# Patient Record
Sex: FEMALE | Race: WHITE | Employment: FULL TIME | URBAN - METROPOLITAN AREA
[De-identification: names, ages, dates, MRNs, and addresses within clinical notes are randomized per-mention and may not be internally consistent; named-entity substitution may affect disease eponyms.]

---

## 2017-05-05 LAB
HEPATITIS B SURFACE ANTIGEN OB: NEGATIVE
HIV RESULT OB: NEGATIVE
RAPID PLASMA REAGIN OB: NONREACTIVE

## 2017-09-07 ENCOUNTER — HOSPITAL ENCOUNTER (OUTPATIENT)
Facility: HOSPITAL | Age: 22
Setting detail: OBSERVATION
Discharge: HOME | End: 2017-09-07
Attending: OBSTETRICS & GYNECOLOGY | Admitting: OBSTETRICS & GYNECOLOGY
Payer: COMMERCIAL

## 2017-09-07 VITALS — RESPIRATION RATE: 16 BRPM | BODY MASS INDEX: 23 KG/M2 | HEIGHT: 62 IN | HEART RATE: 90 BPM | WEIGHT: 125 LBS

## 2017-09-07 PROBLEM — Z34.90 PREGNANCY: Status: ACTIVE | Noted: 2017-09-07

## 2017-09-07 PROBLEM — Z34.90 PREGNANCY (HCC): Status: ACTIVE | Noted: 2017-09-07

## 2017-09-07 LAB
BILIRUBIN URINE: NEGATIVE
CONTROL RUN WITHIN 24 HOURS?: YES
GLUCOSE URINE: NEGATIVE
KETONE URINE: NEGATIVE
NITRITE URINE: NEGATIVE
PH URINE: 5.5 (ref 5–8)
PROTEIN URINE: NEGATIVE
SPEC GRAVITY: 1.02 (ref 1–1.03)
UROBILINOGEN URINE: 0.2

## 2017-09-07 PROCEDURE — 96372 THER/PROPH/DIAG INJ SC/IM: CPT

## 2017-09-07 PROCEDURE — 81002 URINALYSIS NONAUTO W/O SCOPE: CPT

## 2017-09-07 RX ORDER — TERBUTALINE SULFATE 1 MG/ML
0.25 INJECTION, SOLUTION SUBCUTANEOUS
Status: DISPENSED | OUTPATIENT
Start: 2017-09-07 | End: 2017-09-07

## 2017-09-07 RX ORDER — CHOLECALCIFEROL (VITAMIN D3) 25 MCG
1 TABLET,CHEWABLE ORAL DAILY
COMMUNITY
End: 2018-05-17 | Stop reason: ALTCHOICE

## 2017-09-07 NOTE — PROGRESS NOTES
Received thept to the unit via w/c with c/o bleeding. Pt to triage room to chamge and to void. Pt then into bed that is in the low locked position. efm explained and then applied. No active bleeding noted at this time.   Hx obtained- pt is a 22 yo

## 2017-09-07 NOTE — PROGRESS NOTES
efm removed. D/c explained to the pt. Pt voices her understanding and agreement.   Pt changed and to home in stable cond

## 2017-09-14 ENCOUNTER — HOSPITAL ENCOUNTER (OUTPATIENT)
Facility: HOSPITAL | Age: 22
Setting detail: OBSERVATION
Discharge: HOME | End: 2017-09-14
Attending: OBSTETRICS & GYNECOLOGY | Admitting: OBSTETRICS & GYNECOLOGY
Payer: COMMERCIAL

## 2017-09-14 VITALS
HEART RATE: 83 BPM | SYSTOLIC BLOOD PRESSURE: 118 MMHG | BODY MASS INDEX: 22.79 KG/M2 | HEIGHT: 62.5 IN | RESPIRATION RATE: 16 BRPM | DIASTOLIC BLOOD PRESSURE: 63 MMHG | WEIGHT: 127 LBS | TEMPERATURE: 99 F

## 2017-09-14 LAB
BILIRUBIN URINE: NEGATIVE
CONTROL RUN WITHIN 24 HOURS?: YES
GLUCOSE URINE: NEGATIVE
KETONE URINE: NEGATIVE
NITRITE URINE: NEGATIVE
PH URINE: 7 (ref 5–8)
PROTEIN URINE: NEGATIVE
SPEC GRAVITY: 1.02 (ref 1–1.03)
URINE CLARITY: CLEAR
URINE COLOR: YELLOW
UROBILINOGEN URINE: 0.2

## 2017-09-14 PROCEDURE — 81002 URINALYSIS NONAUTO W/O SCOPE: CPT

## 2017-09-14 NOTE — PROGRESS NOTES
Pt presents as  w/ edc of 17 c/o vaginal bleeding. Pt admitted to triage room 5 via wc accompanied by father. EFM tested and applied, FHTs tracing and audible in 140s.  Pt states she went to the bathroom at [address] and noticed bright red blood when s

## 2017-09-14 NOTE — PROGRESS NOTES
Discharge instructions given and explained, pt verbalizes understanding and agrees. Aware she is to call the office in the morning to schedule a follow up appointment this week. Pt ambulatory and discharged home accompanied by father.

## 2017-09-14 NOTE — NST
Nonstress Test   Patient: Kody Mccauley    Gestation: 45E4H    NST:       Variability: Moderate           Accelerations: Yes           Decelerations: Variable            Baseline: 140 BPM           Uterine Irritability: Yes           Contractions: Not pr

## 2017-10-11 ENCOUNTER — HOSPITAL ENCOUNTER (OUTPATIENT)
Age: 22
Discharge: OTHER INSTITUTION | End: 2017-10-11
Payer: COMMERCIAL

## 2017-10-11 ENCOUNTER — HOSPITAL ENCOUNTER (OUTPATIENT)
Facility: HOSPITAL | Age: 22
Setting detail: OBSERVATION
Discharge: HOME | End: 2017-10-11
Attending: OBSTETRICS & GYNECOLOGY | Admitting: OBSTETRICS & GYNECOLOGY
Payer: COMMERCIAL

## 2017-10-11 VITALS
DIASTOLIC BLOOD PRESSURE: 72 MMHG | TEMPERATURE: 98 F | BODY MASS INDEX: 24 KG/M2 | OXYGEN SATURATION: 98 % | SYSTOLIC BLOOD PRESSURE: 109 MMHG | RESPIRATION RATE: 16 BRPM | HEART RATE: 76 BPM | WEIGHT: 136 LBS

## 2017-10-11 VITALS
DIASTOLIC BLOOD PRESSURE: 72 MMHG | HEIGHT: 62 IN | RESPIRATION RATE: 16 BRPM | SYSTOLIC BLOOD PRESSURE: 119 MMHG | TEMPERATURE: 99 F | BODY MASS INDEX: 25.03 KG/M2 | WEIGHT: 136 LBS | HEART RATE: 75 BPM

## 2017-10-11 DIAGNOSIS — N94.9 GENITAL LESION, FEMALE: ICD-10-CM

## 2017-10-11 DIAGNOSIS — O46.90 VAGINAL BLEEDING DURING PREGNANCY, ANTEPARTUM: Primary | ICD-10-CM

## 2017-10-11 PROCEDURE — 81002 URINALYSIS NONAUTO W/O SCOPE: CPT

## 2017-10-11 PROCEDURE — 99205 OFFICE O/P NEW HI 60 MIN: CPT

## 2017-10-11 PROCEDURE — 81002 URINALYSIS NONAUTO W/O SCOPE: CPT | Performed by: PHYSICIAN ASSISTANT

## 2017-10-11 PROCEDURE — 99215 OFFICE O/P EST HI 40 MIN: CPT

## 2017-10-11 PROCEDURE — 87529 HSV DNA AMP PROBE: CPT | Performed by: PHYSICIAN ASSISTANT

## 2017-10-11 RX ORDER — VALACYCLOVIR HYDROCHLORIDE 1 G/1
1 TABLET, FILM COATED ORAL EVERY 12 HOURS
Qty: 20 TABLET | Refills: 0 | Status: SHIPPED | OUTPATIENT
Start: 2017-10-11 | End: 2017-10-21

## 2017-10-11 NOTE — ED PROVIDER NOTES
Patient Seen in: [facility]    History   Patient presents with:  Eval-G (gynecologic)    Stated Complaint: LG PRIEST    HPI    CHIEF COMPLAINT: Possible herpes outbreak, vaginal bleeding during pregnancy     HISTORY OF PRESENT Montse Escobar vomiting  Genitourinary: no hematuria  Musculoskeletal: no back pain  Skin: no rashes  Neurological: no headache     Otherwise a complete review of systems was obtained and other than the HPI was negative     The patient's medication list, past medical his Result Value    Urine Clarity Slightly cloudy (*)     All other components within normal limits   HSV 1/2 SUBTYPE BY PCR       ============================================================  ED Course  --------------------------------------------------------

## 2017-10-11 NOTE — PROGRESS NOTES
Patient given discharge instructions.  Instructed to call OB's office with any future outbreaks or problems first. Patient voiced understanding
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
## 2017-10-11 NOTE — NST
Nonstress Test   Patient: Janusz Salomon    Gestation: 30w3d    NST:       Variability: Moderate           Accelerations: Yes           Decelerations: None            Baseline: 140 BPM           Uterine Irritability: No           Contractions: Not present

## 2017-10-11 NOTE — PROGRESS NOTES
Patient admitted to triage 3. EFA 30+3 weeks. States she was at [facility] immediate care for HSV outbreak, they prescribed her meds and told her to come here for spotting she has had for last month which her OB's are aware of. EFM tested and applied.

## 2017-10-16 NOTE — ED NOTES
Called to patient and made aware of the positive HSV 1 PCR result. Instructed to completed treatment of Flagyl-appropriate medication.   Discussed with WILLIAM Denson re:patient questions re specific type of HSV 1-oral and HSV 2-genital.   MAde aware type

## 2017-10-29 ENCOUNTER — HOSPITAL ENCOUNTER (OUTPATIENT)
Age: 22
Discharge: HOME | End: 2017-10-29
Attending: FAMILY MEDICINE
Payer: COMMERCIAL

## 2017-10-29 VITALS
HEIGHT: 62 IN | OXYGEN SATURATION: 99 % | TEMPERATURE: 98 F | HEART RATE: 87 BPM | WEIGHT: 136 LBS | BODY MASS INDEX: 25.03 KG/M2 | RESPIRATION RATE: 18 BRPM | SYSTOLIC BLOOD PRESSURE: 114 MMHG | DIASTOLIC BLOOD PRESSURE: 67 MMHG

## 2017-10-29 DIAGNOSIS — J02.9 ACUTE VIRAL PHARYNGITIS: Primary | ICD-10-CM

## 2017-10-29 DIAGNOSIS — J30.9 ACUTE ALLERGIC RHINITIS, UNSPECIFIED SEASONALITY, UNSPECIFIED TRIGGER: ICD-10-CM

## 2017-10-29 PROCEDURE — 87430 STREP A AG IA: CPT | Performed by: FAMILY MEDICINE

## 2017-10-29 PROCEDURE — 87081 CULTURE SCREEN ONLY: CPT | Performed by: FAMILY MEDICINE

## 2017-10-29 PROCEDURE — 99214 OFFICE O/P EST MOD 30 MIN: CPT

## 2017-10-29 PROCEDURE — 99213 OFFICE O/P EST LOW 20 MIN: CPT

## 2017-10-29 NOTE — ED INITIAL ASSESSMENT (HPI)
Patient states sore throat for 2 days  Took an Amoxicillin an hour ago- took dad's medication  Low grade fever at home  Stuffy nose

## 2017-10-30 NOTE — ED PROVIDER NOTES
Patient Seen in: [facility]    History   Patient presents with:  Sore Throat    Stated Complaint: possible strep    HPI    23year old female presents for sore throat. States she has sore throat for past 2 days.  She reports nasa oropharyngeal exudate. Eyes: Conjunctivae and EOM are normal. Pupils are equal, round, and reactive to light. Neck: Normal range of motion. Neck supple. Cardiovascular: Normal rate, regular rhythm, normal heart sounds and intact distal pulses.     Pul

## 2017-11-18 ENCOUNTER — APPOINTMENT (OUTPATIENT)
Dept: ULTRASOUND IMAGING | Facility: HOSPITAL | Age: 22
End: 2017-11-18
Attending: EMERGENCY MEDICINE
Payer: COMMERCIAL

## 2017-11-18 ENCOUNTER — HOSPITAL ENCOUNTER (EMERGENCY)
Facility: HOSPITAL | Age: 22
Discharge: HOME | End: 2017-11-18
Attending: EMERGENCY MEDICINE
Payer: COMMERCIAL

## 2017-11-18 ENCOUNTER — HOSPITAL ENCOUNTER (OUTPATIENT)
Facility: HOSPITAL | Age: 22
Setting detail: OBSERVATION
Discharge: HOME | End: 2017-11-18
Attending: OBSTETRICS & GYNECOLOGY | Admitting: OBSTETRICS & GYNECOLOGY
Payer: COMMERCIAL

## 2017-11-18 VITALS
BODY MASS INDEX: 24.05 KG/M2 | WEIGHT: 134.06 LBS | OXYGEN SATURATION: 96 % | SYSTOLIC BLOOD PRESSURE: 113 MMHG | HEIGHT: 62.5 IN | HEART RATE: 76 BPM | TEMPERATURE: 98 F | DIASTOLIC BLOOD PRESSURE: 58 MMHG | RESPIRATION RATE: 18 BRPM

## 2017-11-18 VITALS
HEIGHT: 62.01 IN | RESPIRATION RATE: 14 BRPM | BODY MASS INDEX: 24.35 KG/M2 | DIASTOLIC BLOOD PRESSURE: 67 MMHG | SYSTOLIC BLOOD PRESSURE: 118 MMHG | HEART RATE: 62 BPM | TEMPERATURE: 99 F | WEIGHT: 134 LBS

## 2017-11-18 DIAGNOSIS — R10.13 EPIGASTRIC PAIN: Primary | ICD-10-CM

## 2017-11-18 PROCEDURE — 80053 COMPREHEN METABOLIC PANEL: CPT | Performed by: OBSTETRICS & GYNECOLOGY

## 2017-11-18 PROCEDURE — 82962 GLUCOSE BLOOD TEST: CPT

## 2017-11-18 PROCEDURE — 96360 HYDRATION IV INFUSION INIT: CPT

## 2017-11-18 PROCEDURE — 59025 FETAL NON-STRESS TEST: CPT

## 2017-11-18 PROCEDURE — 76700 US EXAM ABDOM COMPLETE: CPT | Performed by: EMERGENCY MEDICINE

## 2017-11-18 PROCEDURE — 96361 HYDRATE IV INFUSION ADD-ON: CPT

## 2017-11-18 PROCEDURE — 99284 EMERGENCY DEPT VISIT MOD MDM: CPT

## 2017-11-18 PROCEDURE — 83690 ASSAY OF LIPASE: CPT | Performed by: EMERGENCY MEDICINE

## 2017-11-18 PROCEDURE — 36415 COLL VENOUS BLD VENIPUNCTURE: CPT

## 2017-11-18 PROCEDURE — 85025 COMPLETE CBC W/AUTO DIFF WBC: CPT | Performed by: OBSTETRICS & GYNECOLOGY

## 2017-11-18 PROCEDURE — 81002 URINALYSIS NONAUTO W/O SCOPE: CPT

## 2017-11-18 RX ORDER — MAGNESIUM HYDROXIDE/ALUMINUM HYDROXICE/SIMETHICONE 120; 1200; 1200 MG/30ML; MG/30ML; MG/30ML
30 SUSPENSION ORAL ONCE
Status: COMPLETED | OUTPATIENT
Start: 2017-11-18 | End: 2017-11-18

## 2017-11-18 RX ORDER — DEXTROSE, SODIUM CHLORIDE, SODIUM LACTATE, POTASSIUM CHLORIDE, AND CALCIUM CHLORIDE 5; .6; .31; .03; .02 G/100ML; G/100ML; G/100ML; G/100ML; G/100ML
INJECTION, SOLUTION INTRAVENOUS CONTINUOUS
Status: DISCONTINUED | OUTPATIENT
Start: 2017-11-18 | End: 2017-11-18

## 2017-11-18 RX ORDER — SODIUM CHLORIDE, SODIUM LACTATE, POTASSIUM CHLORIDE, CALCIUM CHLORIDE 600; 310; 30; 20 MG/100ML; MG/100ML; MG/100ML; MG/100ML
INJECTION, SOLUTION INTRAVENOUS CONTINUOUS
Status: DISCONTINUED | OUTPATIENT
Start: 2017-11-18 | End: 2017-11-18

## 2017-11-18 NOTE — PROGRESS NOTES
Pt is a 23year old female admitted to TRG2/TRG2-A. Patient presents with:   Complication: started feeling nausous this morning, unable to keep anything down, having some abdominal discomfort     Pt is  35w6d intra-uterine pregnancy.   Histor

## 2017-11-19 NOTE — PLAN OF CARE
Dr Brittney Callaway updated on patient. Pt denies pain at the moment, pain coincides when toco indicates a contraction. Pt finished 2 liters of d5lr. MD updated feels pain less intense and less frequent. Pt states she is comfortable going home.  Orders rec'd for labs

## 2017-11-19 NOTE — ED PROVIDER NOTES
Patient Seen in: [facility] Emergency Department    History   Patient presents with:  Abdomen/Flank Pain (GI/)    Stated Complaint: cleared from OB/ rule out gallbladder    HPI    Patient is a 20-year-old female who states that since this morning resting comfortably on the cart in no acute distress. HEENT: Extraocular muscles intact, pupils equal round reactive to light and accommodation. Mouth normal, neck supple, no meningismus. LUNGS: Lungs clear to auscultation bilaterally.   CARDIOVASCULAR:

## 2017-11-19 NOTE — PLAN OF CARE
Pt off monitor, urine sample rec'd, pt states when she sat up to go to the bathroom and when she was in the bathroom, she had a \"severe\" pain in her upper abdomen.  This rn asked patient again if she would like to be further evaluated in the ER for pain,

## 2017-11-19 NOTE — PLAN OF CARE
ER notified of patient coming to the ER. This Rn asked about giving report, ER states they will be able to look up her information.

## 2017-11-19 NOTE — ED NOTES
Accucheck checked to verify lab draw. Noted to be low. Alert, not diaphoretic. Given 240 ml of juice PO. Patient has apples and crackers at bedside that is eating. Will recheck and make ready for U/S. Dr. Ysabel Clarke aware.

## 2017-11-19 NOTE — PLAN OF CARE
Report rec'd from [address] [place] [address]. Pt resting, pt states she fell asleep for about 45 minutes, d5lr infusing without s/s of infiltration or infection. Pt states pain is less intense and less frequent.  Pt states when she does have the pain it last for about 10

## 2017-11-19 NOTE — ED INITIAL ASSESSMENT (HPI)
Arrives from L&D with c/o RUQ pain and vomiting since this morning at 1100. Unable to tolerate any PO. States was monitored in L&D and received two bags of fluids. Was to go home but then began having several RUQ pain again.

## 2017-11-19 NOTE — NST
Nonstress Test   Patient: Janifer     Gestation: 35w6d    NST:       Variability: Moderate           Accelerations: Yes           Decelerations: None            Baseline: 145 BPM           Uterine Irritability: No           Contractions: Irregular

## 2017-12-16 ENCOUNTER — ANESTHESIA (OUTPATIENT)
Dept: OBGYN UNIT | Facility: HOSPITAL | Age: 22
End: 2017-12-16
Payer: COMMERCIAL

## 2017-12-16 ENCOUNTER — ANESTHESIA EVENT (OUTPATIENT)
Dept: OBGYN UNIT | Facility: HOSPITAL | Age: 22
End: 2017-12-16
Payer: COMMERCIAL

## 2017-12-16 ENCOUNTER — SURGERY (OUTPATIENT)
Age: 22
End: 2017-12-16

## 2017-12-16 ENCOUNTER — HOSPITAL ENCOUNTER (INPATIENT)
Facility: HOSPITAL | Age: 22
LOS: 3 days | Discharge: HOME | End: 2017-12-19
Attending: OBSTETRICS & GYNECOLOGY | Admitting: OBSTETRICS & GYNECOLOGY
Payer: COMMERCIAL

## 2017-12-16 PROCEDURE — 59514 CESAREAN DELIVERY ONLY: CPT | Performed by: OBSTETRICS & GYNECOLOGY

## 2017-12-16 RX ORDER — DEXTROSE, SODIUM CHLORIDE, SODIUM LACTATE, POTASSIUM CHLORIDE, AND CALCIUM CHLORIDE 5; .6; .31; .03; .02 G/100ML; G/100ML; G/100ML; G/100ML; G/100ML
INJECTION, SOLUTION INTRAVENOUS AS NEEDED
Status: DISCONTINUED | OUTPATIENT
Start: 2017-12-16 | End: 2017-12-17 | Stop reason: HOSPADM

## 2017-12-16 RX ORDER — IBUPROFEN 600 MG/1
600 TABLET ORAL ONCE AS NEEDED
Status: DISCONTINUED | OUTPATIENT
Start: 2017-12-16 | End: 2017-12-17 | Stop reason: HOSPADM

## 2017-12-16 RX ORDER — TRISODIUM CITRATE DIHYDRATE AND CITRIC ACID MONOHYDRATE 500; 334 MG/5ML; MG/5ML
30 SOLUTION ORAL ONCE
Status: COMPLETED | OUTPATIENT
Start: 2017-12-16 | End: 2017-12-16

## 2017-12-16 RX ORDER — TERBUTALINE SULFATE 1 MG/ML
0.25 INJECTION, SOLUTION SUBCUTANEOUS AS NEEDED
Status: DISCONTINUED | OUTPATIENT
Start: 2017-12-16 | End: 2017-12-17 | Stop reason: HOSPADM

## 2017-12-16 RX ORDER — SODIUM CHLORIDE, SODIUM LACTATE, POTASSIUM CHLORIDE, CALCIUM CHLORIDE 600; 310; 30; 20 MG/100ML; MG/100ML; MG/100ML; MG/100ML
INJECTION, SOLUTION INTRAVENOUS CONTINUOUS
Status: DISCONTINUED | OUTPATIENT
Start: 2017-12-16 | End: 2017-12-17 | Stop reason: HOSPADM

## 2017-12-16 RX ORDER — TRISODIUM CITRATE DIHYDRATE AND CITRIC ACID MONOHYDRATE 500; 334 MG/5ML; MG/5ML
30 SOLUTION ORAL AS NEEDED
Status: DISCONTINUED | OUTPATIENT
Start: 2017-12-16 | End: 2017-12-17 | Stop reason: HOSPADM

## 2017-12-16 RX ORDER — NALBUPHINE HCL 10 MG/ML
2.5 AMPUL (ML) INJECTION
Status: DISCONTINUED | OUTPATIENT
Start: 2017-12-16 | End: 2017-12-19

## 2017-12-16 RX ORDER — DIPHENHYDRAMINE HYDROCHLORIDE 50 MG/ML
25 INJECTION INTRAMUSCULAR; INTRAVENOUS ONCE AS NEEDED
Status: ACTIVE | OUTPATIENT
Start: 2017-12-16 | End: 2017-12-16

## 2017-12-16 RX ORDER — EPHEDRINE SULFATE 50 MG/ML
5 INJECTION, SOLUTION INTRAVENOUS AS NEEDED
Status: DISCONTINUED | OUTPATIENT
Start: 2017-12-16 | End: 2017-12-17

## 2017-12-16 RX ORDER — KETOROLAC TROMETHAMINE 30 MG/ML
INJECTION, SOLUTION INTRAMUSCULAR; INTRAVENOUS
Status: COMPLETED
Start: 2017-12-16 | End: 2017-12-16

## 2017-12-16 RX ORDER — SODIUM CHLORIDE, SODIUM LACTATE, POTASSIUM CHLORIDE, CALCIUM CHLORIDE 600; 310; 30; 20 MG/100ML; MG/100ML; MG/100ML; MG/100ML
INJECTION, SOLUTION INTRAVENOUS CONTINUOUS
Status: DISCONTINUED | OUTPATIENT
Start: 2017-12-16 | End: 2017-12-19

## 2017-12-16 RX ORDER — NALBUPHINE HCL 10 MG/ML
2.5 AMPUL (ML) INJECTION
Status: DISCONTINUED | OUTPATIENT
Start: 2017-12-16 | End: 2017-12-17 | Stop reason: HOSPADM

## 2017-12-16 RX ORDER — CEFAZOLIN SODIUM/WATER 2 G/20 ML
2 SYRINGE (ML) INTRAVENOUS
Status: DISCONTINUED | OUTPATIENT
Start: 2017-12-16 | End: 2017-12-17 | Stop reason: HOSPADM

## 2017-12-16 RX ORDER — HYDROMORPHONE HYDROCHLORIDE 1 MG/ML
0.4 INJECTION, SOLUTION INTRAMUSCULAR; INTRAVENOUS; SUBCUTANEOUS EVERY 5 MIN PRN
Status: DISCONTINUED | OUTPATIENT
Start: 2017-12-16 | End: 2017-12-17 | Stop reason: HOSPADM

## 2017-12-16 RX ORDER — ONDANSETRON 2 MG/ML
4 INJECTION INTRAMUSCULAR; INTRAVENOUS ONCE AS NEEDED
Status: ACTIVE | OUTPATIENT
Start: 2017-12-16 | End: 2017-12-16

## 2017-12-16 RX ORDER — KETOROLAC TROMETHAMINE 30 MG/ML
30 INJECTION, SOLUTION INTRAMUSCULAR; INTRAVENOUS ONCE AS NEEDED
Status: COMPLETED | OUTPATIENT
Start: 2017-12-16 | End: 2017-12-16

## 2017-12-17 RX ORDER — DIPHENHYDRAMINE HYDROCHLORIDE 50 MG/ML
12.5 INJECTION INTRAMUSCULAR; INTRAVENOUS EVERY 4 HOURS PRN
Status: DISCONTINUED | OUTPATIENT
Start: 2017-12-17 | End: 2017-12-19

## 2017-12-17 RX ORDER — ONDANSETRON 2 MG/ML
4 INJECTION INTRAMUSCULAR; INTRAVENOUS EVERY 6 HOURS PRN
Status: DISCONTINUED | OUTPATIENT
Start: 2017-12-17 | End: 2017-12-19

## 2017-12-17 RX ORDER — DEXTROSE, SODIUM CHLORIDE, SODIUM LACTATE, POTASSIUM CHLORIDE, AND CALCIUM CHLORIDE 5; .6; .31; .03; .02 G/100ML; G/100ML; G/100ML; G/100ML; G/100ML
INJECTION, SOLUTION INTRAVENOUS CONTINUOUS
Status: DISCONTINUED | OUTPATIENT
Start: 2017-12-17 | End: 2017-12-19

## 2017-12-17 RX ORDER — HYDROCODONE BITARTRATE AND ACETAMINOPHEN 10; 325 MG/1; MG/1
1 TABLET ORAL EVERY 4 HOURS PRN
Status: DISCONTINUED | OUTPATIENT
Start: 2017-12-17 | End: 2017-12-19

## 2017-12-17 RX ORDER — BISACODYL 10 MG
10 SUPPOSITORY, RECTAL RECTAL
Status: DISCONTINUED | OUTPATIENT
Start: 2017-12-17 | End: 2017-12-19

## 2017-12-17 RX ORDER — SIMETHICONE 80 MG
80 TABLET,CHEWABLE ORAL DAILY PRN
Status: DISCONTINUED | OUTPATIENT
Start: 2017-12-17 | End: 2017-12-19

## 2017-12-17 RX ORDER — IBUPROFEN 600 MG/1
600 TABLET ORAL EVERY 6 HOURS SCHEDULED
Status: DISCONTINUED | OUTPATIENT
Start: 2017-12-17 | End: 2017-12-19

## 2017-12-17 RX ORDER — NALOXONE HYDROCHLORIDE 0.4 MG/ML
0.08 INJECTION, SOLUTION INTRAMUSCULAR; INTRAVENOUS; SUBCUTANEOUS
Status: ACTIVE | OUTPATIENT
Start: 2017-12-17 | End: 2017-12-18

## 2017-12-17 RX ORDER — NALBUPHINE HCL 10 MG/ML
2.5 AMPUL (ML) INJECTION EVERY 4 HOURS PRN
Status: DISCONTINUED | OUTPATIENT
Start: 2017-12-17 | End: 2017-12-19

## 2017-12-17 RX ORDER — KETOROLAC TROMETHAMINE 30 MG/ML
30 INJECTION, SOLUTION INTRAMUSCULAR; INTRAVENOUS EVERY 6 HOURS PRN
Status: DISPENSED | OUTPATIENT
Start: 2017-12-17 | End: 2017-12-18

## 2017-12-17 RX ORDER — MISOPROSTOL 200 UG/1
600 TABLET ORAL ONCE AS NEEDED
Status: ACTIVE | OUTPATIENT
Start: 2017-12-17 | End: 2017-12-17

## 2017-12-17 RX ORDER — HYDROCODONE BITARTRATE AND ACETAMINOPHEN 5; 325 MG/1; MG/1
1 TABLET ORAL EVERY 4 HOURS PRN
Status: DISCONTINUED | OUTPATIENT
Start: 2017-12-17 | End: 2017-12-19

## 2017-12-17 RX ORDER — ZOLPIDEM TARTRATE 5 MG/1
5 TABLET ORAL NIGHTLY PRN
Status: DISCONTINUED | OUTPATIENT
Start: 2017-12-17 | End: 2017-12-19

## 2017-12-17 RX ORDER — DOCUSATE SODIUM 100 MG/1
100 CAPSULE, LIQUID FILLED ORAL
Status: DISCONTINUED | OUTPATIENT
Start: 2017-12-17 | End: 2017-12-19

## 2017-12-17 RX ORDER — HYDROMORPHONE HYDROCHLORIDE 1 MG/ML
0.4 INJECTION, SOLUTION INTRAMUSCULAR; INTRAVENOUS; SUBCUTANEOUS EVERY 2 HOUR PRN
Status: ACTIVE | OUTPATIENT
Start: 2017-12-17 | End: 2017-12-18

## 2017-12-17 RX ORDER — DIPHENHYDRAMINE HCL 25 MG
25 CAPSULE ORAL EVERY 4 HOURS PRN
Status: DISCONTINUED | OUTPATIENT
Start: 2017-12-17 | End: 2017-12-19

## 2017-12-17 NOTE — PROGRESS NOTES
[facility]    Patients Name: Carlon Romberg  Attending Physician: Michael Gonzalez MD  CSN: [id number]    Location:  1346/4614-I  MRN: [id number]    YOB: 1995  Admission Date: 12/16/2017     Obstetric Anesthesia Pain Progress Note

## 2017-12-17 NOTE — PROGRESS NOTES
NURSING ADMISSION NOTE      Patient admitted via Cart  Oriented to room. Safety precautions initiated. Bed in low position. Call light in reach.   Bands checked  Iv infusing and put on pump  Hartman draining  Pt instructed to remain in bed  Teaching an

## 2017-12-17 NOTE — H&P
[address] Patient Status:  Inpatient    1995 MRN [id number]   Location [facility] Attending Kalina Baker MD   Hosp Day # 0 PCP None Pcp     Subjective:  Carla Cohen is a middle     Clinical staff performed the cervical  Exam I repeated the vulvar examination.     Extremeties DTR's normal (2/4), no DVT (negative Grant's sign),  Trace edema       FHT:  130 BPM, Moderate variability, accelerations are present, no decelerations

## 2017-12-17 NOTE — OPERATIVE REPORT
[facility]   Section - Operative Note    Chyrl Lav Patient Status:  Inpatient    1995 MRN [id number]   Location [facility] Attending Rebecca Moseley MD   Hosp Day # 0 PCP None Pcp   Preoperative Diagnos cavity was swept clean using a wet lap. The first layer of the hysterotomy was closed using 0 Vicryl. A second imbricating layer was placed with 0 Vicryl. The incision was inspected for hemostasis. The self retaining retractor was removed.   Re-inspecti

## 2017-12-17 NOTE — ANESTHESIA PREPROCEDURE EVALUATION
PRE-OP EVALUATION    Patient Name: Radha Couch    Pre-op Diagnosis: * No pre-op diagnosis entered *    Procedure(s):      Surgeon(s) and Role:     * Jillian Linton MD - Primary     * Юлия Nieves MD - Assisting Surgeon    Pre-op vitals reviewed Neuro/Psych                                    Past Surgical History:  No date: TONSILLECTOMY     Smoking status: Never Smoker    Smokeless tobacco: Never Used    Alcohol use No       Drug use: No     Available pre-op labs reviewed.     Lab Results  Compone

## 2017-12-17 NOTE — BH PROGRESS NOTE
Called the patients nurse, Andrea Metz. She said, the pt will be here for at least 2 more days. Left [facility] a message about seeing the pt tomorrow for f/u with the EPDS.

## 2017-12-17 NOTE — PROGRESS NOTES
Pt comfortable w/ epidural, ricks placed after thorough visual inspection done. Two small abrasions noted above urethra, pt w/ hx of HSV not on any prophylaxis at this time. Pt states some hx of itching, \"maybe I scratched it. \" Dr Leanna Lewis called & notified

## 2017-12-17 NOTE — PROGRESS NOTES
Pt & baby transferred via cart in stable condition to rm 2193. Baby to crib, pt assisted into low locked bed w/ call light w/i reach, IV & ricks intact, abdominal dressing dry & intact. Baby bands checked, hugs/kisses intact. Report to ALEJANDRO Peacock.

## 2017-12-17 NOTE — PLAN OF CARE
Problem: Patient/Family Goals  Goal: Patient/Family Long Term Goal  Patient's Long Term Goal: adequate pain control    Interventions:  -   - See additional Care Plan goals for specific interventions   Outcome: Progressing    Goal: Patient/Family Short Term

## 2017-12-17 NOTE — PROGRESS NOTES
[facility]  Post-Partum Caesarean Section Progress Note    Lo Raniey Patient Status:  Inpatient    1995 MRN [id number]   [facility] 2SW-J Attending Pedrito Barbour MD   Hosp Day # 1 PCP None Pcp     SUBJECTIVE:    Post

## 2017-12-18 NOTE — PROGRESS NOTES
[facility]  Post-Partum Caesarean Section Progress Note    Segundo Amaya Patient Status:  Inpatient    1995 MRN [id number]   [facility] 2SW-J Attending Rosie Schafer MD   Hosp Day # 2 PCP None Pcp     SUBJECTIVE:    Post

## 2017-12-18 NOTE — BH PROGRESS NOTE
[facility] PPD SCREENING    Reason for Referral: This patient was referred for further behavioral health follow up due to EPDS score of 12, no thoughts of self-harm, positive indicators for anxiety.   She was interviewed in the presence of her father Landon Cedillor, for a p she can maintain limits and boundaries with mother. Mental Health Status:    Current Symptoms: Episodic tearfulness, anxiety about breast feeding.    Appearance/General Behavior: No impairment   General Cognitive Functioning: No impairment   Thought Pro

## 2017-12-19 VITALS
SYSTOLIC BLOOD PRESSURE: 108 MMHG | OXYGEN SATURATION: 99 % | BODY MASS INDEX: 26.87 KG/M2 | HEART RATE: 76 BPM | TEMPERATURE: 99 F | DIASTOLIC BLOOD PRESSURE: 67 MMHG | RESPIRATION RATE: 20 BRPM | WEIGHT: 146 LBS | HEIGHT: 62 IN

## 2017-12-19 RX ORDER — HYDROCODONE BITARTRATE AND ACETAMINOPHEN 5; 325 MG/1; MG/1
1-2 TABLET ORAL EVERY 4 HOURS PRN
Qty: 30 TABLET | Refills: 0 | Status: SHIPPED | OUTPATIENT
Start: 2017-12-19 | End: 2018-05-02

## 2017-12-19 NOTE — PROGRESS NOTES
[facility]  Post-Partum Caesarean Section Progress Note    Boris Dodd Patient Status:  Inpatient    1995 MRN [id number]   [facility] 2SW-J Attending Alondra Wells MD   Hosp Day # 3 PCP None Pcp     SUBJECTIVE:    Post

## 2017-12-19 NOTE — DISCHARGE SUMMARY
[facility]  Discharge Summary    [facility] Patient Status:  Inpatient    1995 MRN [id number]   [facility] 2SW-J Attending Jillian Linton MD   Hosp Day # 3 PCP None Pcp         EDC: Estimated Date of Delivery: 17

## 2017-12-22 NOTE — PAYOR COMM NOTE
--------------  DISCHARGE REVIEW    Payor: [facility]  Subscriber #:  [id number]  Authorization Number: [id number]    Admit date: 12/16/17  Admit time:  6996  Discharge Date: 12/19/2017 10:15 AM     Admitting Physician: Carla Cerna MD

## 2017-12-22 NOTE — PAYOR COMM NOTE
--------------  CONTINUED STAY REVIEW    Payor: [address]  Subscriber #:  [id number]  Authorization Number: [id number]    Admit date: 12/16/17  Admit time: [address]    Admitting Physician: Diamante Diamond MD  Attending Physician:  No att.  prov Post-Partum Caesarean Section Progress Note           Barney Garretjuan Patient Status:  Inpatient    1995 MRN [id number]   [facility] 2SW-J Attending Howard Adams MD   Hosp Day # 2 PCP None Pcp      SUBJECTIVE:     Postpartum D Based On MONY GA Dif GA User Date   Patient Reported  17 Working  Genia Grullon RN 17   Prenatal Results     No configuration template associated with this profile. Contact your .     Incidence and Monitoring     M Indications for : Other - Add Comments   Uterine Incision type: Low Transverse      Delivery location: OR   Delivery Providers     Delivering Clinician: America Richard MD   Delivery personnel:   Provider Role   Shahida Zamora RN [address] [place] [address],[facility] 404

## 2017-12-22 NOTE — PAYOR COMM NOTE
--------------  ADMISSION REVIEW     Payor: [address]  Subscriber #:  [id number]  Authorization Number: [id number]    Admit date: 12/16/17  Admit time: [facility]       Admitting Physician: Rosie Schafer MD  Attending Physician:  Susan Hdz genitalia: Small linear defects above the urethra times two measuring about 2 mm's each.    Cervix:       Dilation: 4 cm's     Effacement: 75%     Station:  -3     Consistency: soft     Position: middle                           Clinical staff performed the

## 2017-12-23 ENCOUNTER — TELEPHONE (OUTPATIENT)
Dept: OBGYN UNIT | Facility: HOSPITAL | Age: 22
End: 2017-12-23

## 2017-12-23 NOTE — PROGRESS NOTES
Reviewed self and infant care w / mom, she verbalizes understanding of instructions reviewed. Encourage to follow up w/ MDs as directed and w/ questions/concerns.  Lives ws grandparents for help, No FOB involved, denies ppd or bb but care reviewed, took bab

## 2018-04-25 ENCOUNTER — HOSPITAL ENCOUNTER (OUTPATIENT)
Dept: GENERAL RADIOLOGY | Age: 23
Discharge: HOME | End: 2018-04-25
Attending: PHYSICIAN ASSISTANT

## 2018-04-25 ENCOUNTER — OFFICE VISIT (OUTPATIENT)
Dept: OCCUPATIONAL MEDICINE | Age: 23
End: 2018-04-25
Attending: PHYSICIAN ASSISTANT

## 2018-04-25 DIAGNOSIS — S66.911A MUSCLE STRAIN OF RIGHT WRIST, INITIAL ENCOUNTER: Primary | ICD-10-CM

## 2018-04-25 DIAGNOSIS — S66.911A MUSCLE STRAIN OF RIGHT WRIST, INITIAL ENCOUNTER: ICD-10-CM

## 2018-04-25 PROCEDURE — 73110 X-RAY EXAM OF WRIST: CPT | Performed by: PHYSICIAN ASSISTANT

## 2018-05-02 ENCOUNTER — APPOINTMENT (OUTPATIENT)
Dept: CT IMAGING | Facility: HOSPITAL | Age: 23
End: 2018-05-02
Attending: EMERGENCY MEDICINE
Payer: COMMERCIAL

## 2018-05-02 ENCOUNTER — OFFICE VISIT (OUTPATIENT)
Dept: OCCUPATIONAL MEDICINE | Age: 23
End: 2018-05-02
Attending: PHYSICIAN ASSISTANT
Payer: OTHER MISCELLANEOUS

## 2018-05-02 ENCOUNTER — HOSPITAL ENCOUNTER (EMERGENCY)
Facility: HOSPITAL | Age: 23
Discharge: HOME | End: 2018-05-02
Attending: EMERGENCY MEDICINE
Payer: COMMERCIAL

## 2018-05-02 VITALS
SYSTOLIC BLOOD PRESSURE: 122 MMHG | TEMPERATURE: 99 F | HEART RATE: 59 BPM | RESPIRATION RATE: 18 BRPM | WEIGHT: 129 LBS | OXYGEN SATURATION: 99 % | DIASTOLIC BLOOD PRESSURE: 88 MMHG | HEIGHT: 62 IN | BODY MASS INDEX: 23.74 KG/M2

## 2018-05-02 DIAGNOSIS — S66.911D MUSCLE STRAIN OF RIGHT WRIST, SUBSEQUENT ENCOUNTER: Primary | ICD-10-CM

## 2018-05-02 DIAGNOSIS — G43.809 OTHER MIGRAINE WITHOUT STATUS MIGRAINOSUS, NOT INTRACTABLE: Primary | ICD-10-CM

## 2018-05-02 PROCEDURE — 96372 THER/PROPH/DIAG INJ SC/IM: CPT

## 2018-05-02 PROCEDURE — 99284 EMERGENCY DEPT VISIT MOD MDM: CPT

## 2018-05-02 PROCEDURE — 96365 THER/PROPH/DIAG IV INF INIT: CPT

## 2018-05-02 PROCEDURE — 96375 TX/PRO/DX INJ NEW DRUG ADDON: CPT

## 2018-05-02 PROCEDURE — 70450 CT HEAD/BRAIN W/O DYE: CPT | Performed by: EMERGENCY MEDICINE

## 2018-05-02 RX ORDER — KETOROLAC TROMETHAMINE 30 MG/ML
30 INJECTION, SOLUTION INTRAMUSCULAR; INTRAVENOUS ONCE
Status: COMPLETED | OUTPATIENT
Start: 2018-05-02 | End: 2018-05-02

## 2018-05-02 RX ORDER — DEXAMETHASONE SODIUM PHOSPHATE 4 MG/ML
10 VIAL (ML) INJECTION ONCE
Status: COMPLETED | OUTPATIENT
Start: 2018-05-02 | End: 2018-05-02

## 2018-05-02 RX ORDER — SUMATRIPTAN 6 MG/.5ML
6 INJECTION, SOLUTION SUBCUTANEOUS ONCE
Status: COMPLETED | OUTPATIENT
Start: 2018-05-02 | End: 2018-05-02

## 2018-05-02 RX ORDER — METOCLOPRAMIDE HYDROCHLORIDE 5 MG/ML
10 INJECTION INTRAMUSCULAR; INTRAVENOUS ONCE
Status: COMPLETED | OUTPATIENT
Start: 2018-05-02 | End: 2018-05-02

## 2018-05-02 RX ORDER — SUMATRIPTAN 25 MG/1
TABLET, FILM COATED ORAL
Qty: 20 TABLET | Refills: 0 | Status: SHIPPED | OUTPATIENT
Start: 2018-05-02 | End: 2018-05-17

## 2018-05-03 ENCOUNTER — TELEPHONE (OUTPATIENT)
Dept: NEUROLOGY | Facility: CLINIC | Age: 23
End: 2018-05-03

## 2018-05-03 NOTE — ED INITIAL ASSESSMENT (HPI)
Patient reports to ER with complaint of migraine X 2 days, with no relief with OTC medications. Patient reports intermittent migraines X 1 mo, each last about 1-2 days. Patient has not seen PCP or neuro for this.  Patient reports nausea but denies any blurr

## 2018-05-03 NOTE — ED PROVIDER NOTES
Patient Seen in: [facility] Emergency Department    History   Patient presents with:  Headache (neurologic)    Stated Complaint: ha    HPI    Patient is a 53-year-old female comes emergency room for evaluation of a headache.   Patient planes of a hea to light accommodation, extraocular motion is intact, sclerae white, conjunctiva is pink. Oropharynx is unremarkable, no exudate. NECK: Supple, trachea midline, no lymphadenopathy.    LUNG: Lungs clear to auscultation bilaterally, no wheezing, no rales, n Views), Right (cpt=73110)    Result Date: 4/25/2018  PROCEDURE:  XR WRIST NAVICULAR COMPLETE (3 VIEWS), RIGHT (CPT=73110)  TECHNIQUE:  Four views were obtained including dedicated navicular view. COMPARISON:  None.   INDICATIONS:  PATIENT STATED HISTORY: ( further evaluation, treatment or admission on an emergency basis. Comprehensive verbal and written discharge and follow-up instructions were provided to help prevent relapse or worsening.   Patient was instructed to follow-up with her primary care provider

## 2018-05-17 ENCOUNTER — OFFICE VISIT (OUTPATIENT)
Dept: NEUROLOGY | Facility: CLINIC | Age: 23
End: 2018-05-17

## 2018-05-17 VITALS
BODY MASS INDEX: 25.12 KG/M2 | RESPIRATION RATE: 16 BRPM | DIASTOLIC BLOOD PRESSURE: 69 MMHG | HEIGHT: 62.5 IN | SYSTOLIC BLOOD PRESSURE: 114 MMHG | HEART RATE: 74 BPM | WEIGHT: 140 LBS

## 2018-05-17 DIAGNOSIS — G43.709 CHRONIC MIGRAINE WITHOUT AURA WITHOUT STATUS MIGRAINOSUS, NOT INTRACTABLE: Primary | ICD-10-CM

## 2018-05-17 PROCEDURE — 99204 OFFICE O/P NEW MOD 45 MIN: CPT | Performed by: OTHER

## 2018-05-17 PROCEDURE — 1111F DSCHRG MED/CURRENT MED MERGE: CPT | Performed by: OTHER

## 2018-05-17 RX ORDER — TOPIRAMATE 25 MG/1
TABLET ORAL
Qty: 120 TABLET | Refills: 2 | Status: SHIPPED | OUTPATIENT
Start: 2018-05-17

## 2018-05-17 RX ORDER — DIHYDROERGOTAMINE MESYLATE 4 MG/ML
SPRAY NASAL
Qty: 1 VIAL | Refills: 0 | Status: SHIPPED | OUTPATIENT
Start: 2018-05-17

## 2018-05-17 NOTE — PATIENT INSTRUCTIONS
For acute treatment of migraine, try naproxen (Aleve) 500 mg or Migranal spray as directed  For prevention of migraine, start Topiramate (Topamax) as follows:  start at 25 mg nightly x1 week, then increase to 50 mg nightly x1 week, then 25 mg AM / 50 mg PM OWN NARCOTIC PRESCRIPTIONS. • Written prescriptions must be picked up in office. • Please allow the office 2-3 business days to fill the prescription. • Patient must present photo ID at time of .   PLEASE NOTE: PRESCRIPTIONS MUST BE PICKED UP ROGER completion. • Fill in and sign any sections that are to be completed by the patient prior to submitting forms to office staff. • Form completion may require an additional fee.    • A signed Release of Information (ROXANN) must be on file before forms may be

## 2018-05-17 NOTE — H&P
[facility] New Patient / Consult Visit    Aileen Cox is a 23year old female. Referring MD: No ref.  provider found    Patient presents with:  Hospital F/U: Migraines and gets 6-7 a month      HPI:    Sheba Barber current outbreak   • Migraines      Past Surgical History:  2017:  DELIVERY ONLY  2011: TONSILLECTOMY  Smoking status: Never Smoker                                                              Smokeless tobacco: Never Used                      A discs sharp bilaterally    Cranial Nerves: II through XII  Optic:    Pupils: equally round and reactive to light with direct and consensual responses, normal accomodation   Visual acuity: Normal              Visual fields: Normal  Oculomotor/Trochlear/Abdu examination is currently normal and nonfocal.  Patient's description of her symptoms appears most consistent with a migraine type headache. She was recently tried on migraine specific medication, with sumatriptan, but noted closing sensation.   As such, migrainosus, not intractable  (primary encounter diagnosis)  Plan: Dihydroergotamine Mesylate (MIGRANAL) 4 MG/ML         Nasal Solution, topiramate 25 MG Oral Tab        As noted above     Return in about 2 months (around 7/17/2018).     Guru Anthony MD,

## 2018-07-11 ENCOUNTER — TELEPHONE (OUTPATIENT)
Dept: NEUROLOGY | Facility: CLINIC | Age: 23
End: 2018-07-11
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
## 2019-09-05 PROBLEM — Z34.90 PREGNANCY: Status: RESOLVED | Noted: 2017-09-07 | Resolved: 2019-09-05

## 2019-09-05 PROBLEM — Z34.90 PREGNANCY (HCC): Status: RESOLVED | Noted: 2017-09-07 | Resolved: 2019-09-05

## 2019-09-17 PROBLEM — Z97.5 IUD CONTRACEPTION: Status: ACTIVE | Noted: 2019-09-17

## 2020-07-22 ENCOUNTER — HOSPITAL ENCOUNTER (OUTPATIENT)
Dept: BEHAVIORAL HEALTH | Age: 25
Discharge: STILL ADMITTED OR ADMINISTRATIVE | End: 2020-07-22
Attending: NURSE PRACTITIONER

## 2020-07-22 PROCEDURE — 90836 PSYTX W PT W E/M 45 MIN: CPT | Performed by: NURSE PRACTITIONER

## 2021-01-04 ENCOUNTER — HOSPITAL ENCOUNTER (OUTPATIENT)
Dept: BEHAVIORAL HEALTH | Age: 26
Discharge: STILL ADMITTED OR ADMINISTRATIVE | End: 2021-01-04
Attending: NURSE PRACTITIONER

## 2021-01-04 PROCEDURE — 99212 OFFICE O/P EST SF 10 MIN: CPT | Performed by: NURSE PRACTITIONER

## 2021-01-04 PROCEDURE — 90836 PSYTX W PT W E/M 45 MIN: CPT | Performed by: NURSE PRACTITIONER

## 2021-01-12 ENCOUNTER — APPOINTMENT (OUTPATIENT)
Dept: BEHAVIORAL HEALTH | Age: 26
End: 2021-01-12
Attending: NURSE PRACTITIONER

## 2021-05-19 ENCOUNTER — IMMUNIZATION (OUTPATIENT)
Dept: LAB | Facility: HOSPITAL | Age: 26
End: 2021-05-19
Attending: EMERGENCY MEDICINE
Payer: COMMERCIAL

## 2021-05-19 DIAGNOSIS — Z23 NEED FOR VACCINATION: Primary | ICD-10-CM

## 2021-05-19 PROCEDURE — 0001A SARSCOV2 VAC 30MCG/0.3ML IM: CPT

## 2021-05-26 ENCOUNTER — APPOINTMENT (OUTPATIENT)
Dept: BEHAVIORAL HEALTH | Age: 26
End: 2021-05-26

## 2021-06-01 ENCOUNTER — BEHAVIORAL HEALTH (OUTPATIENT)
Dept: BEHAVIORAL HEALTH | Age: 26
End: 2021-06-01

## 2021-06-01 DIAGNOSIS — F31.81 BIPOLAR II DISORDER (CMD): ICD-10-CM

## 2021-06-01 PROCEDURE — 90834 PSYTX W PT 45 MINUTES: CPT | Performed by: NURSE PRACTITIONER

## 2021-06-01 RX ORDER — LAMOTRIGINE 25 MG/1
25 TABLET ORAL DAILY
Qty: 30 TABLET | Refills: 1 | Status: SHIPPED | OUTPATIENT
Start: 2021-06-01 | End: 2021-06-28 | Stop reason: SDUPTHER

## 2021-06-09 ENCOUNTER — IMMUNIZATION (OUTPATIENT)
Dept: LAB | Facility: HOSPITAL | Age: 26
End: 2021-06-09
Attending: EMERGENCY MEDICINE
Payer: COMMERCIAL

## 2021-06-09 DIAGNOSIS — Z23 NEED FOR VACCINATION: Primary | ICD-10-CM

## 2021-06-09 PROCEDURE — 0002A SARSCOV2 VAC 30MCG/0.3ML IM: CPT

## 2021-06-16 ENCOUNTER — BEHAVIORAL HEALTH (OUTPATIENT)
Dept: BEHAVIORAL HEALTH | Age: 26
End: 2021-06-16

## 2021-06-16 DIAGNOSIS — F31.0 BIPOLAR AFFECTIVE DISORDER, CURRENT EPISODE HYPOMANIC (CMD): Primary | ICD-10-CM

## 2021-06-16 PROCEDURE — 90833 PSYTX W PT W E/M 30 MIN: CPT | Performed by: NURSE PRACTITIONER

## 2021-06-16 PROCEDURE — 99214 OFFICE O/P EST MOD 30 MIN: CPT | Performed by: NURSE PRACTITIONER

## 2021-06-16 RX ORDER — LAMOTRIGINE 50 MG/1
50 TABLET, ORALLY DISINTEGRATING ORAL DAILY
Qty: 30 TABLET | Refills: 3 | Status: SHIPPED | OUTPATIENT
Start: 2021-06-16 | End: 2021-06-28

## 2021-06-28 ENCOUNTER — BEHAVIORAL HEALTH (OUTPATIENT)
Dept: BEHAVIORAL HEALTH | Age: 26
End: 2021-06-28

## 2021-06-28 DIAGNOSIS — F31.81 BIPOLAR II DISORDER (CMD): ICD-10-CM

## 2021-06-28 PROCEDURE — 90833 PSYTX W PT W E/M 30 MIN: CPT | Performed by: NURSE PRACTITIONER

## 2021-06-28 PROCEDURE — 99214 OFFICE O/P EST MOD 30 MIN: CPT | Performed by: NURSE PRACTITIONER

## 2021-06-28 RX ORDER — LAMOTRIGINE 25 MG/1
TABLET ORAL
Qty: 60 TABLET | Refills: 1 | Status: SHIPPED | OUTPATIENT
Start: 2021-06-28 | End: 2021-09-02 | Stop reason: SDUPTHER

## 2021-07-12 ENCOUNTER — V-VISIT (OUTPATIENT)
Dept: BEHAVIORAL HEALTH | Age: 26
End: 2021-07-12

## 2021-07-12 DIAGNOSIS — F33.1 MAJOR DEPRESSIVE DISORDER, RECURRENT EPISODE, MODERATE (CMD): ICD-10-CM

## 2021-07-12 PROCEDURE — 90791 PSYCH DIAGNOSTIC EVALUATION: CPT | Performed by: PSYCHOLOGIST

## 2021-07-13 ENCOUNTER — BEHAVIORAL HEALTH (OUTPATIENT)
Dept: BEHAVIORAL HEALTH | Age: 26
End: 2021-07-13

## 2021-07-13 DIAGNOSIS — F31.81 BIPOLAR II DISORDER (CMD): ICD-10-CM

## 2021-07-13 PROCEDURE — 99214 OFFICE O/P EST MOD 30 MIN: CPT | Performed by: NURSE PRACTITIONER

## 2021-07-13 PROCEDURE — 90833 PSYTX W PT W E/M 30 MIN: CPT | Performed by: NURSE PRACTITIONER

## 2021-07-24 ENCOUNTER — TELEPHONE (OUTPATIENT)
Dept: SCHEDULING | Age: 26
End: 2021-07-24

## 2021-07-26 ENCOUNTER — APPOINTMENT (OUTPATIENT)
Dept: BEHAVIORAL HEALTH | Age: 26
End: 2021-07-26

## 2021-09-03 RX ORDER — LAMOTRIGINE 25 MG/1
TABLET ORAL
Qty: 60 TABLET | Refills: 1 | Status: SHIPPED | OUTPATIENT
Start: 2021-09-03 | End: 2021-10-04 | Stop reason: SDUPTHER

## 2021-09-20 ENCOUNTER — TELEPHONE (OUTPATIENT)
Dept: BEHAVIORAL HEALTH | Age: 26
End: 2021-09-20

## 2021-09-21 ENCOUNTER — TELEPHONE (OUTPATIENT)
Dept: BEHAVIORAL HEALTH | Age: 26
End: 2021-09-21

## 2021-10-03 RX ORDER — LAMOTRIGINE 25 MG/1
TABLET ORAL
Qty: 60 TABLET | Refills: 1 | Status: CANCELLED | OUTPATIENT
Start: 2021-10-03

## 2021-10-04 RX ORDER — LAMOTRIGINE 25 MG/1
TABLET ORAL
Qty: 60 TABLET | Refills: 1 | Status: SHIPPED | OUTPATIENT
Start: 2021-10-04 | End: 2021-11-02 | Stop reason: SDUPTHER

## 2021-11-02 RX ORDER — LAMOTRIGINE 25 MG/1
TABLET ORAL
Qty: 60 TABLET | Refills: 1 | Status: SHIPPED | OUTPATIENT
Start: 2021-11-02 | End: 2022-01-03 | Stop reason: SDUPTHER

## 2021-11-02 RX ORDER — LAMOTRIGINE 25 MG/1
TABLET ORAL
Qty: 60 TABLET | Refills: 1 | Status: CANCELLED | OUTPATIENT
Start: 2021-11-02

## 2021-11-05 RX ORDER — LAMOTRIGINE 25 MG/1
TABLET ORAL
Qty: 60 TABLET | Refills: 1 | OUTPATIENT
Start: 2021-11-05

## 2022-01-03 ENCOUNTER — TELEPHONE (OUTPATIENT)
Dept: BEHAVIORAL HEALTH | Age: 27
End: 2022-01-03

## 2022-01-03 RX ORDER — LAMOTRIGINE 25 MG/1
TABLET ORAL
Qty: 60 TABLET | Refills: 1 | Status: SHIPPED | OUTPATIENT
Start: 2022-01-03 | End: 2022-01-17 | Stop reason: SDUPTHER

## 2022-01-17 RX ORDER — LAMOTRIGINE 25 MG/1
TABLET ORAL
Qty: 60 TABLET | Refills: 1 | Status: SHIPPED | OUTPATIENT
Start: 2022-01-17 | End: 2022-04-06 | Stop reason: DRUGHIGH

## 2022-03-30 RX ORDER — LAMOTRIGINE 25 MG/1
TABLET ORAL
Qty: 60 TABLET | Refills: 1 | OUTPATIENT
Start: 2022-03-30

## 2022-04-06 ENCOUNTER — TELEPHONE (OUTPATIENT)
Dept: BEHAVIORAL HEALTH | Age: 27
End: 2022-04-06

## 2022-04-06 ENCOUNTER — BEHAVIORAL HEALTH (OUTPATIENT)
Dept: BEHAVIORAL HEALTH | Age: 27
End: 2022-04-06

## 2022-04-06 DIAGNOSIS — F31.81 BIPOLAR II DISORDER (CMD): ICD-10-CM

## 2022-04-06 PROCEDURE — 99214 OFFICE O/P EST MOD 30 MIN: CPT | Performed by: NURSE PRACTITIONER

## 2022-04-06 PROCEDURE — 90833 PSYTX W PT W E/M 30 MIN: CPT | Performed by: NURSE PRACTITIONER

## 2022-04-06 RX ORDER — LAMOTRIGINE 50 MG/1
50 TABLET, EXTENDED RELEASE ORAL DAILY
Qty: 30 TABLET | Refills: 3 | Status: SHIPPED | OUTPATIENT
Start: 2022-04-06 | End: 2022-04-06 | Stop reason: SDUPTHER

## 2022-04-06 RX ORDER — LAMOTRIGINE 100 MG/1
100 TABLET ORAL DAILY
COMMUNITY
End: 2022-04-06 | Stop reason: SDUPTHER

## 2022-04-06 RX ORDER — LAMOTRIGINE 100 MG/1
100 TABLET, ORALLY DISINTEGRATING ORAL DAILY
Qty: 30 TABLET | Refills: 3 | Status: SHIPPED | OUTPATIENT
Start: 2022-04-06 | End: 2022-04-06

## 2022-04-06 RX ORDER — LAMOTRIGINE 100 MG/1
100 TABLET ORAL DAILY
Qty: 30 TABLET | Refills: 0 | Status: SHIPPED | OUTPATIENT
Start: 2022-04-06 | End: 2022-05-09 | Stop reason: SDUPTHER

## 2022-05-09 ENCOUNTER — TELEPHONE (OUTPATIENT)
Dept: BEHAVIORAL HEALTH | Age: 27
End: 2022-05-09

## 2022-05-09 RX ORDER — LAMOTRIGINE 100 MG/1
100 TABLET ORAL DAILY
Qty: 30 TABLET | Refills: 0 | Status: SHIPPED | OUTPATIENT
Start: 2022-05-09 | End: 2022-07-28 | Stop reason: SDUPTHER

## 2022-05-09 RX ORDER — LAMOTRIGINE 100 MG/1
100 TABLET ORAL DAILY
Qty: 30 TABLET | Refills: 0 | OUTPATIENT
Start: 2022-05-09

## 2022-05-11 ENCOUNTER — BEHAVIORAL HEALTH (OUTPATIENT)
Dept: BEHAVIORAL HEALTH | Age: 27
End: 2022-05-11

## 2022-05-11 DIAGNOSIS — F31.81 BIPOLAR II DISORDER (CMD): ICD-10-CM

## 2022-05-11 PROCEDURE — 99214 OFFICE O/P EST MOD 30 MIN: CPT | Performed by: NURSE PRACTITIONER

## 2022-05-11 PROCEDURE — 90833 PSYTX W PT W E/M 30 MIN: CPT | Performed by: NURSE PRACTITIONER

## 2022-08-01 RX ORDER — LAMOTRIGINE 100 MG/1
100 TABLET ORAL DAILY
Qty: 30 TABLET | Refills: 0 | Status: SHIPPED | OUTPATIENT
Start: 2022-08-01

## 2022-10-19 PROCEDURE — 87591 N.GONORRHOEAE DNA AMP PROB: CPT | Performed by: OBSTETRICS & GYNECOLOGY

## 2022-10-19 PROCEDURE — 87491 CHLMYD TRACH DNA AMP PROBE: CPT | Performed by: OBSTETRICS & GYNECOLOGY

## 2022-10-19 PROCEDURE — 87624 HPV HI-RISK TYP POOLED RSLT: CPT | Performed by: OBSTETRICS & GYNECOLOGY

## 2022-11-16 PROCEDURE — 88305 TISSUE EXAM BY PATHOLOGIST: CPT | Performed by: OBSTETRICS & GYNECOLOGY

## 2022-11-16 PROCEDURE — 88342 IMHCHEM/IMCYTCHM 1ST ANTB: CPT | Performed by: OBSTETRICS & GYNECOLOGY

## 2023-03-10 ENCOUNTER — APPOINTMENT (OUTPATIENT)
Dept: GENERAL RADIOLOGY | Age: 28
End: 2023-03-10
Attending: NURSE PRACTITIONER
Payer: COMMERCIAL

## 2023-03-10 ENCOUNTER — HOSPITAL ENCOUNTER (OUTPATIENT)
Age: 28
Discharge: HOME | End: 2023-03-10
Payer: COMMERCIAL

## 2023-03-10 VITALS
HEIGHT: 62.5 IN | WEIGHT: 143 LBS | BODY MASS INDEX: 25.66 KG/M2 | HEART RATE: 54 BPM | SYSTOLIC BLOOD PRESSURE: 129 MMHG | RESPIRATION RATE: 18 BRPM | TEMPERATURE: 98 F | OXYGEN SATURATION: 98 % | DIASTOLIC BLOOD PRESSURE: 50 MMHG

## 2023-03-10 DIAGNOSIS — S16.1XXA STRAIN OF NECK MUSCLE, INITIAL ENCOUNTER: ICD-10-CM
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
## 2023-03-10 PROCEDURE — 99204 OFFICE O/P NEW MOD 45 MIN: CPT

## 2023-03-10 PROCEDURE — 99213 OFFICE O/P EST LOW 20 MIN: CPT

## 2023-03-10 PROCEDURE — 72050 X-RAY EXAM NECK SPINE 4/5VWS: CPT | Performed by: NURSE PRACTITIONER

## 2023-03-10 RX ORDER — CYCLOBENZAPRINE HCL 10 MG
10 TABLET ORAL 3 TIMES DAILY PRN
Qty: 20 TABLET | Refills: 0 | Status: SHIPPED | OUTPATIENT
Start: 2023-03-10 | End: 2023-03-17
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
## 2023-10-24 ENCOUNTER — E-ADVICE (OUTPATIENT)
Dept: BEHAVIORAL HEALTH | Age: 28
End: 2023-10-24

## 2023-10-25 ENCOUNTER — TELEPHONE (OUTPATIENT)
Dept: BEHAVIORAL HEALTH | Age: 28
End: 2023-10-25

## 2023-10-25 RX ORDER — LAMOTRIGINE 100 MG/1
100 TABLET ORAL DAILY
Qty: 30 TABLET | Refills: 0 | OUTPATIENT
Start: 2023-10-25

## 2023-10-26 ENCOUNTER — APPOINTMENT (OUTPATIENT)
Dept: BEHAVIORAL HEALTH | Age: 28
End: 2023-10-26

## 2023-10-31 ENCOUNTER — BEHAVIORAL HEALTH (OUTPATIENT)
Dept: BEHAVIORAL HEALTH | Age: 28
End: 2023-10-31

## 2023-10-31 DIAGNOSIS — F31.81 BIPOLAR II DISORDER (CMD): ICD-10-CM

## 2023-10-31 PROCEDURE — 90836 PSYTX W PT W E/M 45 MIN: CPT | Performed by: NURSE PRACTITIONER

## 2023-10-31 PROCEDURE — 99214 OFFICE O/P EST MOD 30 MIN: CPT | Performed by: NURSE PRACTITIONER

## 2023-10-31 RX ORDER — LAMOTRIGINE 25 MG/1
25 TABLET ORAL DAILY
Qty: 30 TABLET | Refills: 0 | Status: SHIPPED | OUTPATIENT
Start: 2023-10-31

## 2023-12-20 ENCOUNTER — TELEPHONE (OUTPATIENT)
Dept: BEHAVIORAL HEALTH | Age: 28
End: 2023-12-20

## 2023-12-27 ENCOUNTER — APPOINTMENT (OUTPATIENT)
Dept: BEHAVIORAL HEALTH | Age: 28
End: 2023-12-27

## 2023-12-27 DIAGNOSIS — F31.81 BIPOLAR II DISORDER (CMD): Primary | ICD-10-CM

## 2023-12-27 PROCEDURE — 90833 PSYTX W PT W E/M 30 MIN: CPT | Performed by: NURSE PRACTITIONER

## 2023-12-27 PROCEDURE — 99214 OFFICE O/P EST MOD 30 MIN: CPT | Performed by: NURSE PRACTITIONER

## 2023-12-27 RX ORDER — LAMOTRIGINE 25 MG/1
25 TABLET ORAL DAILY
Qty: 30 TABLET | Refills: 0 | Status: SHIPPED | OUTPATIENT
Start: 2023-12-27

## 2024-01-16 RX ORDER — LAMOTRIGINE 25 MG/1
25 TABLET ORAL DAILY
Qty: 30 TABLET | Refills: 0 | Status: SHIPPED | OUTPATIENT
Start: 2024-01-16

## 2024-01-24 ENCOUNTER — APPOINTMENT (OUTPATIENT)
Dept: BEHAVIORAL HEALTH | Age: 29
End: 2024-01-24

## 2024-01-24 DIAGNOSIS — F31.81 BIPOLAR II DISORDER (CMD): Primary | ICD-10-CM

## 2024-01-24 PROCEDURE — 99214 OFFICE O/P EST MOD 30 MIN: CPT | Performed by: NURSE PRACTITIONER

## 2024-01-24 RX ORDER — LAMOTRIGINE 50 MG/1
50 TABLET, EXTENDED RELEASE ORAL DAILY
Qty: 30 TABLET | Refills: 1 | Status: SHIPPED | OUTPATIENT
Start: 2024-01-24 | End: 2024-01-24

## 2024-01-24 RX ORDER — LAMOTRIGINE 25 MG/1
TABLET ORAL
Qty: 60 TABLET | Refills: 1 | Status: SHIPPED | OUTPATIENT
Start: 2024-01-24

## 2024-01-24 RX ORDER — LAMOTRIGINE 25 MG/1
25 TABLET, ORALLY DISINTEGRATING ORAL DAILY
Qty: 30 TABLET | Refills: 0 | Status: SHIPPED | OUTPATIENT
Start: 2024-01-24

## 2024-02-21 ENCOUNTER — APPOINTMENT (OUTPATIENT)
Dept: BEHAVIORAL HEALTH | Age: 29
End: 2024-02-21

## 2024-02-21 DIAGNOSIS — F31.81 BIPOLAR II DISORDER (CMD): Primary | ICD-10-CM

## 2024-02-21 RX ORDER — LAMOTRIGINE 100 MG/1
100 TABLET ORAL DAILY
Qty: 90 TABLET | Refills: 1 | Status: SHIPPED | OUTPATIENT
Start: 2024-02-21

## 2024-05-08 ENCOUNTER — V-VISIT (OUTPATIENT)
Dept: URGENT CARE | Age: 29
End: 2024-05-08

## 2024-05-08 VITALS
RESPIRATION RATE: 18 BRPM | HEART RATE: 89 BPM | WEIGHT: 155.7 LBS | TEMPERATURE: 97.8 F | OXYGEN SATURATION: 98 % | DIASTOLIC BLOOD PRESSURE: 74 MMHG | BODY MASS INDEX: 28.65 KG/M2 | HEIGHT: 62 IN | SYSTOLIC BLOOD PRESSURE: 114 MMHG

## 2024-05-08 DIAGNOSIS — J02.9 PHARYNGITIS, UNSPECIFIED ETIOLOGY: Primary | ICD-10-CM

## 2024-05-08 DIAGNOSIS — J02.9 SORE THROAT: ICD-10-CM

## 2024-05-08 LAB
INTERNAL PROCEDURAL CONTROLS ACCEPTABLE: YES
S PYO AG THROAT QL IA.RAPID: NEGATIVE
TEST LOT EXPIRATION DATE: NORMAL
TEST LOT NUMBER: NORMAL

## 2024-05-08 ASSESSMENT — ENCOUNTER SYMPTOMS
COUGH: 0
GASTROINTESTINAL NEGATIVE: 1
SORE THROAT: 1
NAUSEA: 0
ALLERGIC/IMMUNOLOGIC NEGATIVE: 1
TROUBLE SWALLOWING: 1
EYES NEGATIVE: 1
HEADACHES: 1
PSYCHIATRIC NEGATIVE: 1
ENDOCRINE NEGATIVE: 1
FEVER: 1
VOMITING: 0
HEMATOLOGIC/LYMPHATIC NEGATIVE: 1
FATIGUE: 1

## 2024-05-08 ASSESSMENT — PAIN SCALES - GENERAL: PAINLEVEL: 9

## 2024-05-10 LAB — S PYO SPEC QL CULT: NORMAL

## 2024-07-28 ENCOUNTER — HOSPITAL ENCOUNTER (EMERGENCY)
Facility: HOSPITAL | Age: 29
Discharge: HOME | End: 2024-07-28
Attending: EMERGENCY MEDICINE

## 2024-07-28 VITALS
BODY MASS INDEX: 26.68 KG/M2 | HEART RATE: 98 BPM | RESPIRATION RATE: 16 BRPM | TEMPERATURE: 98 F | HEIGHT: 62 IN | DIASTOLIC BLOOD PRESSURE: 68 MMHG | SYSTOLIC BLOOD PRESSURE: 100 MMHG | WEIGHT: 145 LBS | OXYGEN SATURATION: 96 %

## 2024-07-28 DIAGNOSIS — J02.0 STREP PHARYNGITIS: Primary | ICD-10-CM

## 2024-07-28 LAB
FLUAV + FLUBV RNA SPEC NAA+PROBE: NEGATIVE
FLUAV + FLUBV RNA SPEC NAA+PROBE: NEGATIVE
RSV RNA SPEC NAA+PROBE: NEGATIVE
SARS-COV-2 RNA RESP QL NAA+PROBE: NOT DETECTED

## 2024-07-28 PROCEDURE — 87430 STREP A AG IA: CPT | Performed by: EMERGENCY MEDICINE

## 2024-07-28 PROCEDURE — S0119 ONDANSETRON 4 MG: HCPCS | Performed by: EMERGENCY MEDICINE

## 2024-07-28 PROCEDURE — 0241U SARS-COV-2/FLU A AND B/RSV BY PCR (GENEXPERT): CPT | Performed by: EMERGENCY MEDICINE

## 2024-07-28 PROCEDURE — 99283 EMERGENCY DEPT VISIT LOW MDM: CPT

## 2024-07-28 PROCEDURE — 0241U SARS-COV-2/FLU A AND B/RSV BY PCR (GENEXPERT): CPT

## 2024-07-28 PROCEDURE — 87430 STREP A AG IA: CPT

## 2024-07-28 PROCEDURE — 99284 EMERGENCY DEPT VISIT MOD MDM: CPT

## 2024-07-28 RX ORDER — AMOXICILLIN 875 MG/1
875 TABLET, COATED ORAL 2 TIMES DAILY
Qty: 20 TABLET | Refills: 0 | Status: SHIPPED | OUTPATIENT
Start: 2024-07-28 | End: 2024-08-07

## 2024-07-28 RX ORDER — ONDANSETRON 4 MG/1
4 TABLET, ORALLY DISINTEGRATING ORAL EVERY 6 HOURS PRN
Qty: 15 TABLET | Refills: 0 | Status: SHIPPED | OUTPATIENT
Start: 2024-07-28 | End: 2024-08-04

## 2024-07-28 RX ORDER — ONDANSETRON 4 MG/1
4 TABLET, ORALLY DISINTEGRATING ORAL ONCE
Status: COMPLETED | OUTPATIENT
Start: 2024-07-28 | End: 2024-07-28

## 2024-07-28 RX ORDER — AMOXICILLIN 875 MG/1
875 TABLET, COATED ORAL ONCE
Status: COMPLETED | OUTPATIENT
Start: 2024-07-28 | End: 2024-07-28

## 2024-07-28 RX ORDER — DEXAMETHASONE SODIUM PHOSPHATE 4 MG/ML
16 INJECTION, SOLUTION INTRA-ARTICULAR; INTRALESIONAL; INTRAMUSCULAR; INTRAVENOUS; SOFT TISSUE ONCE
Status: COMPLETED | OUTPATIENT
Start: 2024-07-28 | End: 2024-07-28

## 2024-07-28 NOTE — ED PROVIDER NOTES
Patient Seen in: [facility] Emergency Department      History     Chief Complaint   Patient presents with    Fever    Sore Throat    Nausea/Vomiting/Diarrhea     Body aches, fever & sore throat yesterday. Vomiting today     Stated Complaint:     Subjective:   HPI    28-year-old female presents for evaluation of fever to 102, sore throat, body aches and vomiting for the past 24 hours.  Significant other sick with similar symptoms last week.  Hurts to swallow.  No chest pain or shortness of breath.  No diarrhea.  Did not test for COVID    Objective:   Past Medical History:    Chlamydia    history of    Depression    Herpes    current outbreak    Migraines              Past Surgical History:   Procedure Laterality Date      2017     delivery only  2017    Tonsillectomy                  Social History     Socioeconomic History    Marital status: Single   Tobacco Use    Smoking status: Never    Smokeless tobacco: Never   Substance and Sexual Activity    Alcohol use: No    Drug use: No    Sexual activity: Yes     Partners: Male     Birth control/protection: Condom   Other Topics Concern    Caffeine Concern Yes     Comment: 20 cups a month    Exercise Yes     Comment: walks     Social Determinants of Health      Received from [facility]    Social Connections    Received from [facility], [facility]    Housing Stability              Review of Systems    Positive for stated Chief Complaint: Fever, Sore Throat, and Nausea/Vomiting/Diarrhea (Body aches, fever & sore throat yesterday. Vomiting today)    Other systems are as noted in HPI.  Constitutional and vital signs reviewed.      All other systems reviewed and negative except as noted above.    Physical Exam     ED Triage Vitals [24 0810]   /68   Pulse 98   Resp 16   Temp 98.3 °F (36.8 °C)   Temp src    SpO2 96 %   O2 Device None (Room  air)       Current Vitals:   Vital Signs  BP: 100/68  Pulse: 98  Resp: 16  Temp: 98.3 °F (36.8 °C)    Oxygen Therapy  SpO2: 96 %  O2 Device: None (Room air)            Physical Exam    General: Alert, oriented, no apparent distress  HEENT:  Pupils equal reactive.  Extraocular motions intact. MMM.  OP erythematous, uvula midline  Neck: Supple  Lungs: Clear to auscultation bilaterally.  Heart: Regular rate and rhythm.  Abdomen: Soft, nontender.   Skin: No rash.  No edema.  Neurologic: No focal neurologic deficits.  Normal speech pattern  Musculoskeletal: No tenderness or deformity noted.  Full range of motion throughout.        ED Course     Labs Reviewed   RAPID STREP A SCREEN () - Abnormal; Notable for the following components:       Result Value    Rapid Strep A Result Positive for Beta Streptococcus, Group A (*)     All other components within normal limits   SARS-COV-2/FLU A AND B/RSV BY PCR (GENEXPERT) - Normal    Narrative:     This test is intended for the qualitative detection and differentiation of SARS-CoV-2, influenza A, influenza B, and respiratory syncytial virus (RSV) viral RNA in nasopharyngeal or nares swabs from individuals suspected of respiratory viral infection consistent with COVID-19 by their healthcare provider. Signs and symptoms of respiratory viral infection due to SARS-CoV-2, influenza, and RSV can be similar.    Test performed using the Xpert Xpress SARS-CoV-2/FLU/RSV (real time RT-PCR)  assay on the GeneXpert instrument, [employer], [place] [address].   This test is being used under the Food and Drug Administration's Emergency Use Authorization.    The authorized Fact Sheet for Healthcare Providers for this assay is available upon request from the laboratory.                      MDM      Differential diagnosis includes viral syndrome, strep pharyngitis, peritonsillar abscess      No evidence of PTA on exam.    Strep positive.    COVID negative    Medications   ondansetron (Zofran-ODT)  disintegrating tab 4 mg (4 mg Oral Given 7/28/24 0856)   dexamethasone (Decadron) 4 mg/mL vial as ORAL solution 16 mg (16 mg Oral Given 7/28/24 0856)   amoxicillin (Amoxil) tab 875 mg (875 mg Oral Given 7/28/24 0856)     Amoxicillin and Zofran called to pharmacy.  Antipyretic dosing reviewed.  Follow-up if not better next week.  Return over the weekend if symptoms worsen or if new symptoms develop                             Medical Decision Making      Disposition and Plan     Clinical Impression:  1. Strep pharyngitis         Disposition:  Discharge  7/28/2024  9:14 am    Follow-up:  Ghislaine Cedeno,   [address] [facility] [address]  [facility] [address] [phone]    Call in 2 day(s)  As needed          Medications Prescribed:  Current Discharge Medication List        START taking these medications    Details   amoxicillin 875 MG Oral Tab Take 1 tablet (875 mg total) by mouth 2 (two) times daily for 10 days.  Qty: 20 tablet, Refills: 0      ondansetron 4 MG Oral Tablet Dispersible Take 1 tablet (4 mg total) by mouth every 6 (six) hours as needed for Nausea.  Qty: 15 tablet, Refills: 0

## 2024-10-15 ENCOUNTER — OFFICE VISIT (OUTPATIENT)
Facility: CLINIC | Age: 29
End: 2024-10-15
Payer: COMMERCIAL

## 2024-10-15 VITALS — BODY MASS INDEX: 32 KG/M2 | WEIGHT: 173 LBS | SYSTOLIC BLOOD PRESSURE: 108 MMHG | DIASTOLIC BLOOD PRESSURE: 76 MMHG

## 2024-10-15 DIAGNOSIS — Z30.432 ENCOUNTER FOR REMOVAL OF INTRAUTERINE CONTRACEPTIVE DEVICE: ICD-10-CM

## 2024-10-15 DIAGNOSIS — Z30.432 ENCOUNTER FOR IUD REMOVAL: Primary | ICD-10-CM

## 2024-10-15 PROCEDURE — 58301 REMOVE INTRAUTERINE DEVICE: CPT | Performed by: OBSTETRICS & GYNECOLOGY

## 2024-10-15 PROCEDURE — 3074F SYST BP LT 130 MM HG: CPT | Performed by: OBSTETRICS & GYNECOLOGY

## 2024-10-15 PROCEDURE — 3078F DIAST BP <80 MM HG: CPT | Performed by: OBSTETRICS & GYNECOLOGY

## 2024-10-15 RX ORDER — AMOXICILLIN 500 MG/1
500 TABLET, FILM COATED ORAL 3 TIMES DAILY
COMMUNITY
Start: 2024-06-10 | End: 2024-10-15

## 2024-10-15 RX ORDER — IBUPROFEN 600 MG/1
600 TABLET, FILM COATED ORAL EVERY 6 HOURS PRN
COMMUNITY
Start: 2024-06-10

## 2024-10-15 RX ORDER — LAMOTRIGINE 100 MG/1
100 TABLET ORAL DAILY
COMMUNITY
End: 2024-10-15

## 2024-10-15 NOTE — PROCEDURES
IUD Removal:        Pt counseled on removal of  Liletta  IUD.  Discussed return to fertility and need for contraception if patient does not desire pregnancy at this time.  Discussed side effects and risks of removal. Pt understands and consent signed.  Procedure discussed with the patient in detail including indication, risks, benefits, alternatives and complications.     Pelvic Exam Findings:  Cervix normal.    Procedure:  Speculum placed in the vagina.  Strings were visualized.  The ring forceps was used to grasp IUD strings.  The  IUD was removed without difficulty and shown to the patient  The patient tolerated the procedure well.    Visit Plan:  Discharge instructions were reviewed with the patient.   Fu 2 months for WWE - repeat pap    Lawrence Pinedo MD  10:05 AM  10/15/2024

## 2024-12-03 ENCOUNTER — OFFICE VISIT (OUTPATIENT)
Facility: CLINIC | Age: 29
End: 2024-12-03
Payer: COMMERCIAL

## 2024-12-03 VITALS — WEIGHT: 161 LBS | SYSTOLIC BLOOD PRESSURE: 108 MMHG | BODY MASS INDEX: 29 KG/M2 | DIASTOLIC BLOOD PRESSURE: 70 MMHG

## 2024-12-03 DIAGNOSIS — R87.810 CERVICAL HIGH RISK HUMAN PAPILLOMAVIRUS (HPV) DNA TEST POSITIVE: ICD-10-CM

## 2024-12-03 DIAGNOSIS — Z12.4 SCREENING FOR CERVICAL CANCER: ICD-10-CM

## 2024-12-03 DIAGNOSIS — Z01.419 WELL WOMAN EXAM WITH ROUTINE GYNECOLOGICAL EXAM: Primary | ICD-10-CM

## 2024-12-03 DIAGNOSIS — N91.2 AMENORRHEA: ICD-10-CM

## 2024-12-03 LAB
CONTROL LINE PRESENT WITH A CLEAR BACKGROUND (YES/NO): YES YES/NO
KIT LOT #: NORMAL NUMERIC
PREGNANCY TEST, URINE: POSITIVE

## 2024-12-03 PROCEDURE — 3074F SYST BP LT 130 MM HG: CPT | Performed by: OBSTETRICS & GYNECOLOGY

## 2024-12-03 PROCEDURE — 87624 HPV HI-RISK TYP POOLED RSLT: CPT | Performed by: OBSTETRICS & GYNECOLOGY

## 2024-12-03 PROCEDURE — 81025 URINE PREGNANCY TEST: CPT | Performed by: OBSTETRICS & GYNECOLOGY

## 2024-12-03 PROCEDURE — 3078F DIAST BP <80 MM HG: CPT | Performed by: OBSTETRICS & GYNECOLOGY

## 2024-12-03 PROCEDURE — 99395 PREV VISIT EST AGE 18-39: CPT | Performed by: OBSTETRICS & GYNECOLOGY

## 2024-12-03 RX ORDER — MEDROXYPROGESTERONE ACETATE 10 MG
TABLET ORAL
Qty: 30 TABLET | Refills: 2 | Status: SHIPPED | OUTPATIENT
Start: 2024-12-03 | End: 2024-12-03 | Stop reason: CLARIF

## 2024-12-03 NOTE — PROGRESS NOTES
GYN H&P     Genetic questionnaire reviewed with the patient and she will be referred for genetic counseling if the questionnaire had any positive results.    The [facility] intake form was also reviewed regarding contraception, menstrual periods, urinary health, and vaginal / sexual health    12/3/2024  10:52 AM    Chief Complaint   Patient presents with    Gynecologic Exam     Annual  Liletta was taken out 10/15/24, has not had a cycle in the past 5 years yet.       HPI: Magnolia is a 29 year old  No LMP recorded. (Menstrual status: Other).  (contraception: Liletta removed 10/2024 - no period) here for her annual gyn exam.     She has no complaints.  Absent  Menses - negative hx of missed periods. Denies any pelvic or breast complaints.   Satisfied with current contraception.     Hx of HR HPV with a normal colposcopy 2022    Not not trying to conceive.      Previous encounters and chart reviewed.     OB History    Para Term  AB Living   1 1 1 0 0 1   SAB IAB Ectopic Multiple Live Births   0 0 0 0 1      # Outcome Date GA Lbr Juliano/2nd Weight Sex Type Anes PTL Lv   1 Term 17 39w6d  8 lb 9.7 oz (3.905 kg) M CS-LTranv EPI N ELXX      Complications: Other - see comments       GYN hx:   Menarche: 11  Period Cycle (Days): no cycle 5 yrs ( had IUD)  Period Duration (Days): 4  Use of Birth Control (if yes, specify type): Condoms  Date When Birth Control Last Used: liletta 10/15/24 taken out  Pap Date: 10/19/22  Pap Result Notes: 10/19/22 Neg/Pos; 2017 neg      Past Medical History:    Chlamydia    history of    Depression    Herpes    current outbreak    Migraines     Past Surgical History:   Procedure Laterality Date      2017     delivery only  2017    Tonsillectomy       Allergies[1]  Medications Ordered Prior to Encounter[2]  Family History   Problem Relation Age of Onset    Cancer Paternal Grandfather     Diabetes Maternal Grandmother       Social History     Socioeconomic History    Marital status: Single     Spouse name: Not on file    Number of children: Not on file    Years of education: Not on file    Highest education level: Not on file   Occupational History    Not on file   Tobacco Use    Smoking status: Never    Smokeless tobacco: Never   Substance and Sexual Activity    Alcohol use: No    Drug use: No    Sexual activity: Yes     Partners: Male     Birth control/protection: Condom   Other Topics Concern     Service Not Asked    Blood Transfusions Not Asked    Caffeine Concern Yes     Comment: 20 cups a month    Occupational Exposure Not Asked    Hobby Hazards Not Asked    Sleep Concern Not Asked    Stress Concern Not Asked    Weight Concern Not Asked    Special Diet Not Asked    Back Care Not Asked    Exercise Yes     Comment: walks    Bike Helmet Not Asked    Seat Belt Not Asked    Self-Exams Not Asked   Social History Narrative    Not on file     Social Drivers of Health     Financial Resource Strain: Not on file   Food Insecurity: Not on file   Transportation Needs: Not on file   Physical Activity: Not on file   Stress: Not on file   Social Connections: Unknown (3/14/2021)    [facility], [facility]    Social Connections     Conversations with friends/family/neighbors per week: Not on file   Housing Stability: Low Risk  (7/10/2021)    Received from [facility], [facility]    Housing Stability     Mortgage Payment Concerns?: Not on file     Number of Places Lived in the Last Year: Not on file     Unstable Housing?: Not on file       ROS:     Review of Systems:  General: denies fevers, chills, fatigue and malaise.   Eyes: no visual changes, denies headaches  ENT: no complaints, denies earaches, runny nose, epistaxis, throat pain or sore throat  Respiratory: denies SOB, dyspnea, cough or wheezing  Cardiovascular: denies chest pain,  palpitations, exercise intolerance   GI: denies abdominal pain, diarrhea, constipation  : no complaints, denies dysuria, increased urinary frequency. Menses: no dysmenorrhea, no menorrhagia, no dyspareunia   Hematological/lymphatic: denies history of excessive bleeding or bruising, denies dizziness, lightheadedness.   Breast: denies rashes, skin changes, pain, lumps or discharge   Psychiatric: denies depression, changes in sleep patterns, anxiety  Endocrine: denies hot or cold intolerance, mood changes   Neurological: denies changes in sight, smell, hearing or taste. Denies seizures or tremors  Immunological: denies allergies, denies anaphylaxis, or swollen lymph nodes  Musculoskeletal: denies joint pain, morning stiffness, decreased range of motion         O /70   Wt 161 lb (73 kg)   Breastfeeding No   BMI 29.45 kg/m²         Wt Readings from Last 6 Encounters:   12/03/24 161 lb (73 kg)   10/15/24 173 lb (78.5 kg)   07/28/24 145 lb (65.8 kg)   08/25/23 139 lb (63 kg)   03/10/23 143 lb (64.9 kg)   10/19/22 144 lb (65.3 kg)     Exam:   GENERAL: well developed, well nourished, in no apparent distress, oriented.  SKIN: no rashes, no suspicious lesions  HEENT: normal  NECK: supple; no thyromegaly, no adenopathy  LUNGS: clear to auscultation  CARDIOVASCULAR: normal S1, S2, RRR  BREASTS: soft, nontender, no palpable masses or nodes, no nipple discharge, no skin changes, no axillary adenopathy  ABDOMEN: no scars,  soft, non distended; non tender, no masses  PELVIC: External Genitalia: Normal appearing, no lesions.    Vagina: normal pink mucosa, no lesions, normal clear discharge.    Bladder well supported.  No  anterior or posterior hernias    Cervix: multiparous - contact bleeding, no lesions , No CMT     Uterus: AVAF, mobile, non tender, normal size    Adnexa: non tender, no masses, normal size    Rectal: deferred  EXTREMITIES:  non tender without edema        A/P: Patient is 29 year old female with no  complaints. Here for well woman exam.       Patient counseled on:    Diet/exercise.      Self Breast Exams     Safe sex practices / and living environment     Vaccines:  Annual Flu, Tdap +/- Gardasil  recommended (up to 45 yrs).      Pneumococcal at 65 yrs old, Shingles at 60 yrs old          Pap: done  GC/Chlamydia:  na  Mammogram:  na   Dexa:  na  Colonoscopy / Cologuard:   na  Lipid / Cholesterol:  with PCP           Meds This Visit:    Requested Prescriptions      No prescriptions requested or ordered in this encounter       1. Well woman exam with routine gynecological exam    2. Screening for cervical cancer  - Hpv High Risk , Thin Prep Collect; Future  - ThinPrep PAP Smear B; Future    3. Amenorrhea    4. Cervical high risk human papillomavirus (HPV) DNA test positive    UCG (+) - after patient left  No  Provera      Return in about 1 week (around 12/10/2024) for ultrasound.    Lawrence Pinedo MD   12/3/2024  10:52 AM       This note was created by [employer] voice recognition. Errors in content may be related to improper recognition by the system; efforts to review and correct have been done but errors may still exist. Please contact me with any questions.    Note to patient and family   The 21st Century Cures Act makes medical notes available to patients in the interest of transparency.  However, please be advised that this is a medical document.  It is intended as eppo-yi-mlzs communication.  It is written in medical language which may contain abbreviations or verbiage that are technical and unfamiliar.  It may appear blunt or direct.  Medical documents are intended to carry relevant information, facts as evident, and the clinical opinion of the practitioner.           [1]   Allergies  Allergen Reactions    Sumatriptan Tightness in Throat   [2]   Current Outpatient Medications on File Prior to Visit   Medication Sig Dispense Refill    ibuprofen 600 MG Oral Tab Take 1 tablet (600 mg total) by mouth  every 6 (six) hours as needed for Pain.      valACYclovir 500 MG Oral Tab Two po q day X 3 days (Patient not taking: Reported on 12/3/2024) 30 tablet 2     No current facility-administered medications on file prior to visit.

## 2024-12-04 LAB — HPV E6+E7 MRNA CVX QL NAA+PROBE: NEGATIVE

## 2024-12-10 ENCOUNTER — OFFICE VISIT (OUTPATIENT)
Facility: CLINIC | Age: 29
End: 2024-12-10
Payer: COMMERCIAL

## 2024-12-10 ENCOUNTER — LAB ENCOUNTER (OUTPATIENT)
Dept: LAB | Age: 29
End: 2024-12-10
Attending: OBSTETRICS & GYNECOLOGY
Payer: COMMERCIAL

## 2024-12-10 ENCOUNTER — PATIENT MESSAGE (OUTPATIENT)
Facility: CLINIC | Age: 29
End: 2024-12-10

## 2024-12-10 VITALS — WEIGHT: 161 LBS | SYSTOLIC BLOOD PRESSURE: 104 MMHG | DIASTOLIC BLOOD PRESSURE: 68 MMHG | BODY MASS INDEX: 29 KG/M2

## 2024-12-10 DIAGNOSIS — Z78.9 DATE OF LAST MENSTRUAL PERIOD (LMP) UNKNOWN: Primary | ICD-10-CM

## 2024-12-10 DIAGNOSIS — O20.0 THREATENED ABORTION, ANTEPARTUM (HCC): ICD-10-CM

## 2024-12-10 DIAGNOSIS — Z36.89 ESTABLISH GESTATIONAL AGE, ULTRASOUND (HCC): ICD-10-CM

## 2024-12-10 DIAGNOSIS — Z3A.01 LESS THAN 8 WEEKS GESTATION OF PREGNANCY (HCC): ICD-10-CM

## 2024-12-10 LAB
ANTIBODY SCREEN: NEGATIVE
B-HCG SERPL-ACNC: 4623.3 MIU/ML
PROGEST SERPL-MCNC: 19.17 NG/ML
RH BLOOD TYPE: POSITIVE

## 2024-12-10 PROCEDURE — 3078F DIAST BP <80 MM HG: CPT | Performed by: OBSTETRICS & GYNECOLOGY

## 2024-12-10 PROCEDURE — 86900 BLOOD TYPING SEROLOGIC ABO: CPT | Performed by: OBSTETRICS & GYNECOLOGY

## 2024-12-10 PROCEDURE — 84144 ASSAY OF PROGESTERONE: CPT | Performed by: OBSTETRICS & GYNECOLOGY

## 2024-12-10 PROCEDURE — 86901 BLOOD TYPING SEROLOGIC RH(D): CPT | Performed by: OBSTETRICS & GYNECOLOGY

## 2024-12-10 PROCEDURE — 76817 TRANSVAGINAL US OBSTETRIC: CPT | Performed by: OBSTETRICS & GYNECOLOGY

## 2024-12-10 PROCEDURE — 3074F SYST BP LT 130 MM HG: CPT | Performed by: OBSTETRICS & GYNECOLOGY

## 2024-12-10 PROCEDURE — 84702 CHORIONIC GONADOTROPIN TEST: CPT | Performed by: OBSTETRICS & GYNECOLOGY

## 2024-12-10 PROCEDURE — 86850 RBC ANTIBODY SCREEN: CPT | Performed by: OBSTETRICS & GYNECOLOGY

## 2024-12-10 PROCEDURE — 99212 OFFICE O/P EST SF 10 MIN: CPT | Performed by: OBSTETRICS & GYNECOLOGY

## 2024-12-10 NOTE — PROGRESS NOTES
Gynecology Office Visit      Magnolia Temple is a 29 year old female  No LMP recorded. (contraception:  na)     HPI:     Chief Complaint   Patient presents with    Ultrasound     Establish gestational age, irregular periods    Prenatal Care     EOB appointment, pregnancy tests all positive but thick lining only seen on ultrasound.     Unsure of LMP - no pregnancy symptoms  No pelvic pain    Ultrasond with thick lining only - mixed echoes - watch for molar changes    Chart and previous encounters reviewed.  HISTORY:  Past Medical History:    Chlamydia    history of    Depression    Herpes    current outbreak    Migraines      Past Surgical History:   Procedure Laterality Date      2017     delivery only  2017    Tonsillectomy  2011      Family History   Problem Relation Age of Onset    Cancer Paternal Grandfather     Diabetes Maternal Grandmother       Social History:   Social History     Socioeconomic History    Marital status: Single   Tobacco Use    Smoking status: Never    Smokeless tobacco: Never   Substance and Sexual Activity    Alcohol use: No    Drug use: No    Sexual activity: Yes     Partners: Male     Birth control/protection: Condom   Other Topics Concern    Caffeine Concern Yes     Comment: 20 cups a month    Exercise Yes     Comment: walks     Social Drivers of Health      [facility]    Social Connections    [facility]    Housing Stability        Medications (Active prior to today's visit):  Current Outpatient Medications   Medication Sig Dispense Refill    ibuprofen 600 MG Oral Tab Take 1 tablet (600 mg total) by mouth every 6 (six) hours as needed for Pain.      valACYclovir 500 MG Oral Tab Two po q day X 3 days (Patient not taking: Reported on 12/10/2024) 30 tablet 2       Allergies:  Allergies[1]    Gyn:  Menarche: 11  Period Cycle  (Days): no cycle 5 yrs ( had IUD)/ now pregnant  Period Duration (Days): 4  Use of Birth Control (if yes, specify type): Condoms  Date When Birth Control Last Used: liletta 10/15/24 taken out  Pap Date: 10/19/22  Pap Result Notes: 10/19/22 Neg/Pos; 2017 neg    OB Hx:  OB History    Para Term  AB Living   1 1 1 0 0 1   SAB IAB Ectopic Multiple Live Births   0 0 0 0 1      # Outcome Date GA Lbr Juliano/2nd Weight Sex Type Anes PTL Lv   1 Term 17 39w6d  8 lb 9.7 oz (3.905 kg) M CS-LTranv EPI N LEXX      Complications: Other - see comments         ROS:     10 point ROS completed and was negative, except for pertinent positive and negatives stated in the HPI.    PHYSICAL EXAM:   /68   Wt 161 lb (73 kg)   BMI 29.45 kg/m²      Wt Readings from Last 6 Encounters:   12/10/24 161 lb (73 kg)   24 161 lb (73 kg)   10/15/24 173 lb (78.5 kg)   24 145 lb (65.8 kg)   23 139 lb (63 kg)   03/10/23 143 lb (64.9 kg)        Gen:  Oriented, in no acute distress    Pelvic ultrasound done in a patient with a positive pregnancy test and an unknown last menstrual period.  The imaging is done both transvaginal and transabdominal He on December 10, 2024.    Findings: A anteverted anteflexed uterus with a very thickened endometrial stripe measuring 22 mm there is small anechoic area suggestive of possible early molar changes.  Both ovaries are seen are normal there corpus luteum exist on the right ovary and is without hemorrhage.  There is a trace amount of fluid in the cul-de-sac.  Within the uterine cavity there is no gestational sac    Impression: Thickened endometrial stripe with a positive pregnancy test ectopic pregnancy cannot be ruled out uterine changes could be suggestive of early molar changes so clinical correlation is suggested.     ASSESSMENT/PLAN:     1. Date of last menstrual period (LMP) unknown  - US OB Transvaginal, 1st Trimester [70962]; Future    2. Establish gestational age,  ultrasound (HCC)  - US OB Transvaginal, 1st Trimester [45844]; Future    3. Less than 8 weeks gestation of pregnancy (HCC)  - US OB Transvaginal, 1st Trimester [98934]; Future    4. Threatened , antepartum (HCC)  - HCG, Beta Subunit, Quant; Future  - Progesterone; Future  - Type and screen; Future    Firs trimester counseling   No hx of tubal dz or surgery      Meds This Visit:  Requested Prescriptions      No prescriptions requested or ordered in this encounter       Imaging & Referrals:  US OB TRANSVAGINAL ANY TRIMESTER EMG ONLY     Return in about 1 week (around 2024) for ultrasound, Office Visit.      Lawrence Pinedo MD  12/10/2024  10:29 AM         This note was created by Dragon voice recognition. Errors in content may be related to improper recognition by the system; efforts to review and correct have been done but errors may still exist. Please contact me with any questions.    Note to patient and family   The  Cent[employer] Cures Act makes medical notes available to patients in the interest of transparency.  However, please be advised that this is a medical document.  It is intended as zslk-al-edxy communication.  It is written and medical language may contain abbreviations or verbiage that are technical and unfamiliar.  It may appear blunt or direct.  Medical documents are intended to carry relevant information, facts as evident, and the clinical opinion of the practitioner.           [1]   Allergies  Allergen Reactions    Sumatriptan Tightness in Throat

## 2024-12-11 ENCOUNTER — TELEPHONE (OUTPATIENT)
Facility: CLINIC | Age: 29
End: 2024-12-11

## 2024-12-11 DIAGNOSIS — O20.0 THREATENED ABORTION, ANTEPARTUM (HCC): Primary | ICD-10-CM

## 2024-12-11 LAB
.: NORMAL
.: NORMAL

## 2024-12-11 NOTE — PLAN OF CARE
Obstetrics Post-Partum Progress Note          Assessment and Plan:    Rianna Man is a 31 year old  Post-partum day #11 s/p Induced vaginal delivery secondary to elective.    She delivered at the [facility] on  after elective induction. She was discharged on Nifedipine ER for treatment of gestational hypertension and had been checking her BP at home. On 12/10 she had worsening blood pressures and came to the ED for evaluation. Currently living at [facility]. Has OFP against FOB. Her nifedipine was increased to 60 mg, but she had only been taking 30 mg because she was running out    Labetalol was ordered but held yesterday      L&D complications: Complex psychosocial issues--has psychiatrist  OCD  Schizoaffective disorder  Anxiety      Doing well  Labs WNL  Severe range Bps yesterday, last 157/104 at 1510.   Bps remaining 140s/90s, 50% of the time over the last 12 hours   Denies s/s of preE: HA, visual disturbances, epigastric pain, RUQ pain      Plan:   Continue to monitor Bps  Restart labetalol at 200 mg BID  Seen by social work  Continue to monitor for s/s of Pre Eclampsia   Pain control measures as needed  Reportable signs and symptoms dicussed with the patient  Discharge 1-2 days           Interval History:   Patient is doing well. Bottlefed feeding. Pain is well controlled.. No notable symptoms.            Physical Exam:   All vitals stable  Patient Vitals for the past 12 hrs:   BP Temp Temp src Pulse Resp SpO2 Weight   24 1123 138/86 -- -- 96 -- -- --   24 0820 (!) 148/97 98.4  F (36.9  C) Oral 95 30 98 % --   24 0611 -- -- -- -- -- -- 128.5 kg (283 lb 3.2 oz)   24 0353 (!) 140/87 -- -- 103 -- -- --     Uterine fundus is firm, non-tender, appropriate size   Patellar reflexes +2 bilaterally  No clonus bilaterally  +1 bilateral lower extremity edema           Data:     Recent Results (from the past 24 hours)   Protein  random urine    Collection Time:  GASTROINTESTINAL - ADULT    • Minimal or absence of nausea and vomiting Progressing    • Maintains or returns to baseline bowel function Progressing        GENITOURINARY - ADULT    • Absence of urinary retention Progressing        POSTPARTUM    • Long Term 12/10/24  3:15 PM   Result Value Ref Range    Total Protein Urine mg/dL 10.5   mg/dL    Total Protein Urine mg/mg Creat 0.06 0.00 - 0.20 mg/mg Cr    Creatinine Urine mg/dL 182.1 mg/dL   UA with Microscopic reflex to Culture    Collection Time: 12/10/24  3:15 PM    Specimen: Urine, Clean Catch   Result Value Ref Range    Color Urine Yellow Colorless, Straw, Light Yellow, Yellow    Appearance Urine Clear Clear    Glucose Urine Negative Negative mg/dL    Bilirubin Urine Negative Negative    Ketones Urine Negative Negative mg/dL    Specific Gravity Urine 1.024 1.001 - 1.030    Blood Urine 0.2 mg/dL (A) Negative    pH Urine 5.5 5.0 - 7.0    Protein Albumin Urine Negative Negative mg/dL    Urobilinogen Urine <2.0 <2.0 mg/dL    Nitrite Urine Negative Negative    Leukocyte Esterase Urine Negative Negative    Bacteria Urine Few (A) None Seen /HPF    Mucus Urine Present (A) None Seen /LPF    RBC Urine 1 <=2 /HPF    WBC Urine 2 <=5 /HPF    Squamous Epithelials Urine 2 (H) <=1 /HPF   ALT    Collection Time: 12/11/24  5:35 AM   Result Value Ref Range    ALT 20 0 - 50 U/L   AST    Collection Time: 12/11/24  5:35 AM   Result Value Ref Range    AST 21 0 - 45 U/L   Creatinine    Collection Time: 12/11/24  5:35 AM   Result Value Ref Range    Creatinine 0.82 0.51 - 0.95 mg/dL    GFR Estimate >90 >60 mL/min/1.73m2   Hemoglobin    Collection Time: 12/11/24  5:35 AM   Result Value Ref Range    Hemoglobin 11.2 (L) 11.7 - 15.7 g/dL   Platelet count    Collection Time: 12/11/24  5:35 AM   Result Value Ref Range    Platelet Count 296 150 - 450 10e3/uL     Recent Labs   Lab Test 11/29/24  1615   AS Negative     Recent Labs   Lab Test 12/11/24  0535 12/10/24  1246   HGB 11.2* 10.6*  11.5*     Recent Labs   Lab Test 06/03/24  1326   RUQIGG Positive       Jessica Moritz, APRN CNP

## 2024-12-12 ENCOUNTER — LAB ENCOUNTER (OUTPATIENT)
Dept: LAB | Age: 29
End: 2024-12-12
Attending: OBSTETRICS & GYNECOLOGY
Payer: COMMERCIAL

## 2024-12-12 DIAGNOSIS — O20.0 THREATENED ABORTION, ANTEPARTUM (HCC): ICD-10-CM

## 2024-12-12 LAB — B-HCG SERPL-ACNC: ABNORMAL MIU/ML

## 2024-12-12 PROCEDURE — 84702 CHORIONIC GONADOTROPIN TEST: CPT | Performed by: OBSTETRICS & GYNECOLOGY

## 2024-12-18 ENCOUNTER — ULTRASOUND ENCOUNTER (OUTPATIENT)
Facility: CLINIC | Age: 29
End: 2024-12-18
Payer: COMMERCIAL

## 2024-12-18 DIAGNOSIS — Z3A.01 LESS THAN 8 WEEKS GESTATION OF PREGNANCY (HCC): ICD-10-CM

## 2024-12-18 DIAGNOSIS — Z36.89 ESTABLISH GESTATIONAL AGE, ULTRASOUND (HCC): Primary | ICD-10-CM

## 2024-12-31 ENCOUNTER — ULTRASOUND ENCOUNTER (OUTPATIENT)
Facility: CLINIC | Age: 29
End: 2024-12-31
Payer: COMMERCIAL

## 2024-12-31 DIAGNOSIS — O20.0 THREATENED ABORTION, ANTEPARTUM (HCC): Primary | ICD-10-CM

## 2024-12-31 PROCEDURE — 99212 OFFICE O/P EST SF 10 MIN: CPT

## 2024-12-31 NOTE — PROGRESS NOTES
Gynecology Office Visit      Magnolia Temple is a 29 year old female  No LMP recorded. (contraception:  n/a)     HPI:     Chief Complaint   Patient presents with    Ultrasound     Follow up dates/ viablitily       Here for viability ultrasound - seen 12/10/24 for ultrasound showing thick lining with mixed echos  Unsure of LMP  Had hcg drawn showing rise from 4,623 --> 10,301    Ultrasound today showing no definitive fetal pole, gestational sac measuring 7 weeks    Chart and previous encounters reviewed.  HISTORY:  Past Medical History:    Chlamydia    history of    Depression    Herpes    current outbreak    Migraines      Past Surgical History:   Procedure Laterality Date      2017     delivery only  2017    Tonsillectomy  2011      Family History   Problem Relation Age of Onset    Cancer Paternal Grandfather     Diabetes Maternal Grandmother       Social History:   Social History     Socioeconomic History    Marital status: Single   Tobacco Use    Smoking status: Never    Smokeless tobacco: Never   Substance and Sexual Activity    Alcohol use: No    Drug use: No    Sexual activity: Yes     Partners: Male     Birth control/protection: Condom   Other Topics Concern    Caffeine Concern Yes     Comment: 20 cups a month    Exercise Yes     Comment: walks     Social Drivers of Health      [facility], [facility]    Social Connections    Received from [facility], [facility]    Housing Stability        Medications (Active prior to today's visit):  Current Outpatient Medications   Medication Sig Dispense Refill    ibuprofen 600 MG Oral Tab Take 1 tablet (600 mg total) by mouth every 6 (six) hours as needed for Pain.      valACYclovir 500 MG Oral Tab Two po q day X 3 days (Patient not taking: Reported on 12/10/2024) 30 tablet 2       Allergies:  Allergies[1]    Gyn:       OB Hx:  OB History     Para Term  AB Living   1 1 1 0 0 1   SAB IAB Ectopic Multiple Live Births   0 0 0 0 1      # Outcome Date GA Lbr Juliano/2nd Weight Sex Type Anes PTL Lv   1 Term 17 39w6d  8 lb 9.7 oz (3.905 kg) M CS-LTranv EPI N LEXX      Complications: Other - see comments         ROS:     10 point ROS completed and was negative, except for pertinent positive and negatives stated in the HPI.    PHYSICAL EXAM:   There were no vitals taken for this visit.     Wt Readings from Last 6 Encounters:   12/10/24 161 lb (73 kg)   24 161 lb (73 kg)   10/15/24 173 lb (78.5 kg)   24 145 lb (65.8 kg)   23 139 lb (63 kg)   03/10/23 143 lb (64.9 kg)        Gen:  Oriented, in no acute distress, tearful       ASSESSMENT/PLAN:     1. Threatened , antepartum (HCC)    No IUP on ultrasound today, gestational sac measuring 7 weeks - repeat ultrasound and follow up in 1 week per Dr. Pinedo  All questions answered    Meds This Visit:  Requested Prescriptions      No prescriptions requested or ordered in this encounter       Imaging & Referrals:  None     Return in about 1 week (around 2025), or ultrasound and follow up with Dr. Pinedo.      Agnes Chang, MOON  2024  11:33 AM      This note was created by [employer] voice recognition. Errors in content may be related to improper recognition by the system; efforts to review and correct have been done but errors may still exist. Please contact me with any questions.    Note to patient and family   The 21st Century Cures Act makes medical notes available to patients in the interest of transparency.  However, please be advised that this is a medical document.  It is intended as igra-vr-dgqn communication.  It is written and medical language may contain abbreviations or verbiage that are technical and unfamiliar.  It may appear blunt or direct.  Medical documents are intended to carry relevant information, facts as evident, and the clinical opinion of the  practitioner.           [1]   Allergies  Allergen Reactions    Sumatriptan Tightness in Throat

## 2025-01-08 ENCOUNTER — OFFICE VISIT (OUTPATIENT)
Facility: CLINIC | Age: 30
End: 2025-01-08
Payer: COMMERCIAL

## 2025-01-08 VITALS
HEIGHT: 62 IN | WEIGHT: 165 LBS | SYSTOLIC BLOOD PRESSURE: 108 MMHG | BODY MASS INDEX: 30.36 KG/M2 | DIASTOLIC BLOOD PRESSURE: 70 MMHG

## 2025-01-08 DIAGNOSIS — O02.1 MISSED ABORTION (HCC): ICD-10-CM

## 2025-01-08 DIAGNOSIS — O20.0 THREATENED ABORTION, ANTEPARTUM (HCC): Primary | ICD-10-CM

## 2025-01-08 NOTE — PROGRESS NOTES
Gynecology Office Visit      Magnolia Temple is a 29 year old female  No LMP recorded (lmp unknown). (contraception:  barrier)     HPI:     Chief Complaint   Patient presents with    Ultrasound     Following MAB    Follow - Up     Here to fu on US done today.        Repeat ultrasound today - still no viable IUP despite B HCG over 10 K over a week ago.    Findings consistent with Missed         Chart and previous encounters reviewed.     24    Magnolia Temple is a 29 year old female  No LMP recorded. (contraception:  n/a)      HPI:           Chief Complaint   Patient presents with    Ultrasound       Follow up dates/ viablitily         Here for viability ultrasound - seen 12/10/24 for ultrasound showing thick lining with mixed echos  Unsure of LMP  Had hcg drawn showing rise from 4,623 --> 10,301     Ultrasound today showing no definitive fetal pole, gestational sac measuring 7 weeks      HISTORY:  Past Medical History:    Chlamydia    history of    Depression    Herpes    current outbreak    Migraines      Past Surgical History:   Procedure Laterality Date      2017     delivery only  2017    Tonsillectomy  2011      Family History   Problem Relation Age of Onset    Cancer Paternal Grandfather     Diabetes Maternal Grandmother       Social History:   Social History     Socioeconomic History    Marital status: Single   Tobacco Use    Smoking status: Never    Smokeless tobacco: Never   Substance and Sexual Activity    Alcohol use: No    Drug use: No    Sexual activity: Yes     Partners: Male     Birth control/protection: Condom   Other Topics Concern    Caffeine Concern Yes     Comment: 20 cups a month    Exercise Yes     Comment: walks     Social Drivers of Health      Received from [facility], [facility]    Social Connections    Received from [facility], [facility]  Stability        Medications (Active prior to today's visit):  Current Outpatient Medications   Medication Sig Dispense Refill    ibuprofen 600 MG Oral Tab Take 1 tablet (600 mg total) by mouth every 6 (six) hours as needed for Pain.      valACYclovir 500 MG Oral Tab Two po q day X 3 days (Patient not taking: Reported on 12/3/2024) 30 tablet 2       Allergies:  Allergies[1]    Gyn:  Menarche: 11  Period Cycle (Days): no cycle 5 yrs ( had IUD)/ now pregnant  Period Duration (Days): 4  Use of Birth Control (if yes, specify type): Condoms  Date When Birth Control Last Used: liletta 10/15/24 taken out  Pap Date: 10/19/22  Pap Result Notes: 10/19/22 Neg/Pos; 2017 neg    OB Hx:  OB History    Para Term  AB Living   1 1 1 0 0 1   SAB IAB Ectopic Multiple Live Births   0 0 0 0 1      # Outcome Date GA Lbr Juliano/2nd Weight Sex Type Anes PTL Lv   1 Term 17 39w6d  8 lb 9.7 oz (3.905 kg) M CS-LTranv EPI N LEXX      Complications: Other - see comments         ROS:     10 point ROS completed and was negative, except for pertinent positive and negatives stated in the HPI.    PHYSICAL EXAM:   /70   Ht 62\"   Wt 165 lb (74.8 kg)   LMP  (LMP Unknown)   BMI 30.18 kg/m²      Wt Readings from Last 6 Encounters:   25 165 lb (74.8 kg)   12/10/24 161 lb (73 kg)   24 161 lb (73 kg)   10/15/24 173 lb (78.5 kg)   24 145 lb (65.8 kg)   23 139 lb (63 kg)        Gen:  Oriented, in no acute distress    Sad for loss     Latest Reference Range & Units 12/10/24 13:00 24 09:47   ABO BLOOD TYPE  A    RH Factor  Positive    ANTIBODY SCREEN  Negative    HCG QUANTITATIVE <=4.2 mIU/mL 4,623.3 (H) 10,301.3 (H)   Progesterone No established range for female sex ng/mL 19.17    TYPE AND SCREEN  Rpt    (H): Data is abnormally high  Rpt: View report in Results Review for more information     Follow-up pelvic ultrasound done on a patient with a positive pregnancy test and an unknown gestational age.   Imaging done transvaginally on 2025.    Findings: A gestational sac exists again today measuring 2.9 x 2.4 x 1.4 cm this is slightly smaller than previously imaged there is a yolk sac within this gestational sac which is also slightly smaller measuring 0.18 cm.  There is no fetal pole there is no cardiac activity.  Both ovaries are seen are normal there is no free fluid in the cul-de-sac.    Impression: Nonviable intrauterine pregnancy with changes consistent consistent with a missed         ASSESSMENT/PLAN:     1. Threatened , antepartum (HCC)  - US OB Transvaginal, 1st Trimester [91917]; Future    2. Missed  (HCC)    Options discussed - medical inducement or S D&C - patient wants it out now - risks and benefits discussed.     Meds This Visit:  Requested Prescriptions      No prescriptions requested or ordered in this encounter       Imaging & Referrals:  US OB TRANSVAGINAL ANY TRIMESTER EMG ONLY     Return in about 2 weeks (around 2025) for Post. Op..      Lawrence Pinedo MD  2025  3:55 PM         This note was created by Dragon voice recognition. Errors in content may be related to improper recognition by the system; efforts to review and correct have been done but errors may still exist. Please contact me with any questions.    Note to patient and family   The 21st Century Cures Act makes medical notes available to patients in the interest of transparency.  However, please be advised that this is a medical document.  It is intended as bltz-ss-daed communication.  It is written and medical language may contain abbreviations or verbiage that are technical and unfamiliar.  It may appear blunt or direct.  Medical documents are intended to carry relevant information, facts as evident, and the clinical opinion of the practitioner.           [1]   Allergies  Allergen Reactions    Sumatriptan Tightness in Throat

## 2025-01-09 ENCOUNTER — LAB ENCOUNTER (OUTPATIENT)
Dept: LAB | Age: 30
End: 2025-01-09
Attending: Other
Payer: COMMERCIAL

## 2025-01-09 DIAGNOSIS — Z01.818 PRE-OP TESTING: ICD-10-CM

## 2025-01-09 LAB
ERYTHROCYTE [DISTWIDTH] IN BLOOD BY AUTOMATED COUNT: 12.5 %
HCT VFR BLD AUTO: 39.3 %
HGB BLD-MCNC: 12.8 G/DL
MCH RBC QN AUTO: 28.7 PG (ref 26–34)
MCHC RBC AUTO-ENTMCNC: 32.6 G/DL (ref 31–37)
MCV RBC AUTO: 88.1 FL
PLATELET # BLD AUTO: 309 10(3)UL (ref 150–450)
RBC # BLD AUTO: 4.46 X10(6)UL
WBC # BLD AUTO: 8.4 X10(3) UL (ref 4–11)

## 2025-01-09 PROCEDURE — 85027 COMPLETE CBC AUTOMATED: CPT

## 2025-01-09 PROCEDURE — 36415 COLL VENOUS BLD VENIPUNCTURE: CPT

## 2025-01-10 ENCOUNTER — ANESTHESIA EVENT (OUTPATIENT)
Dept: SURGERY | Facility: HOSPITAL | Age: 30
End: 2025-01-10
Payer: COMMERCIAL

## 2025-01-10 ENCOUNTER — HOSPITAL ENCOUNTER (OUTPATIENT)
Facility: HOSPITAL | Age: 30
Setting detail: HOSPITAL OUTPATIENT SURGERY
Discharge: HOME | End: 2025-01-10
Attending: OBSTETRICS & GYNECOLOGY | Admitting: OBSTETRICS & GYNECOLOGY
Payer: COMMERCIAL

## 2025-01-10 ENCOUNTER — ANESTHESIA (OUTPATIENT)
Dept: SURGERY | Facility: HOSPITAL | Age: 30
End: 2025-01-10
Payer: COMMERCIAL

## 2025-01-10 VITALS
DIASTOLIC BLOOD PRESSURE: 59 MMHG | OXYGEN SATURATION: 100 % | WEIGHT: 156 LBS | HEART RATE: 51 BPM | HEIGHT: 62 IN | RESPIRATION RATE: 16 BRPM | TEMPERATURE: 97 F | SYSTOLIC BLOOD PRESSURE: 104 MMHG | BODY MASS INDEX: 28.71 KG/M2

## 2025-01-10 DIAGNOSIS — Z01.818 PRE-OP TESTING: Primary | ICD-10-CM

## 2025-01-10 PROBLEM — O02.1 MISSED ABORTION (HCC): Status: ACTIVE | Noted: 2025-01-10

## 2025-01-10 PROCEDURE — 59820 CARE OF MISCARRIAGE: CPT | Performed by: OBSTETRICS & GYNECOLOGY

## 2025-01-10 PROCEDURE — 10D17ZZ EXTRACTION OF PRODUCTS OF CONCEPTION, RETAINED, VIA NATURAL OR ARTIFICIAL OPENING: ICD-10-PCS | Performed by: OBSTETRICS & GYNECOLOGY

## 2025-01-10 RX ORDER — OXYTOCIN 10 [USP'U]/ML
INJECTION, SOLUTION INTRAMUSCULAR; INTRAVENOUS AS NEEDED
Status: DISCONTINUED | OUTPATIENT
Start: 2025-01-10 | End: 2025-01-10 | Stop reason: SURG

## 2025-01-10 RX ORDER — ONDANSETRON 2 MG/ML
4 INJECTION INTRAMUSCULAR; INTRAVENOUS EVERY 6 HOURS PRN
Status: DISCONTINUED | OUTPATIENT
Start: 2025-01-10 | End: 2025-01-10

## 2025-01-10 RX ORDER — HYDROMORPHONE HYDROCHLORIDE 1 MG/ML
0.4 INJECTION, SOLUTION INTRAMUSCULAR; INTRAVENOUS; SUBCUTANEOUS EVERY 5 MIN PRN
Status: DISCONTINUED | OUTPATIENT
Start: 2025-01-10 | End: 2025-01-10

## 2025-01-10 RX ORDER — HYDROCODONE BITARTRATE AND ACETAMINOPHEN 5; 325 MG/1; MG/1
2 TABLET ORAL ONCE AS NEEDED
Status: DISCONTINUED | OUTPATIENT
Start: 2025-01-10 | End: 2025-01-10

## 2025-01-10 RX ORDER — NALOXONE HYDROCHLORIDE 0.4 MG/ML
0.08 INJECTION, SOLUTION INTRAMUSCULAR; INTRAVENOUS; SUBCUTANEOUS AS NEEDED
Status: DISCONTINUED | OUTPATIENT
Start: 2025-01-10 | End: 2025-01-10

## 2025-01-10 RX ORDER — HYDROMORPHONE HYDROCHLORIDE 1 MG/ML
0.2 INJECTION, SOLUTION INTRAMUSCULAR; INTRAVENOUS; SUBCUTANEOUS EVERY 5 MIN PRN
Status: DISCONTINUED | OUTPATIENT
Start: 2025-01-10 | End: 2025-01-10

## 2025-01-10 RX ORDER — HYDROMORPHONE HYDROCHLORIDE 1 MG/ML
0.6 INJECTION, SOLUTION INTRAMUSCULAR; INTRAVENOUS; SUBCUTANEOUS EVERY 5 MIN PRN
Status: DISCONTINUED | OUTPATIENT
Start: 2025-01-10 | End: 2025-01-10

## 2025-01-10 RX ORDER — HYDROCODONE BITARTRATE AND ACETAMINOPHEN 5; 325 MG/1; MG/1
1 TABLET ORAL ONCE AS NEEDED
Status: DISCONTINUED | OUTPATIENT
Start: 2025-01-10 | End: 2025-01-10

## 2025-01-10 RX ORDER — MIDAZOLAM HYDROCHLORIDE 1 MG/ML
INJECTION INTRAMUSCULAR; INTRAVENOUS AS NEEDED
Status: DISCONTINUED | OUTPATIENT
Start: 2025-01-10 | End: 2025-01-10 | Stop reason: SURG

## 2025-01-10 RX ORDER — LIDOCAINE HYDROCHLORIDE 10 MG/ML
INJECTION, SOLUTION EPIDURAL; INFILTRATION; INTRACAUDAL; PERINEURAL AS NEEDED
Status: DISCONTINUED | OUTPATIENT
Start: 2025-01-10 | End: 2025-01-10 | Stop reason: SURG

## 2025-01-10 RX ORDER — SODIUM CHLORIDE, SODIUM LACTATE, POTASSIUM CHLORIDE, CALCIUM CHLORIDE 600; 310; 30; 20 MG/100ML; MG/100ML; MG/100ML; MG/100ML
INJECTION, SOLUTION INTRAVENOUS CONTINUOUS
Status: DISCONTINUED | OUTPATIENT
Start: 2025-01-10 | End: 2025-01-10

## 2025-01-10 RX ORDER — VASOPRESSIN 20 [USP'U]/ML
INJECTION, SOLUTION INTRAVENOUS AS NEEDED
Status: DISCONTINUED | OUTPATIENT
Start: 2025-01-10 | End: 2025-01-10 | Stop reason: HOSPADM

## 2025-01-10 RX ORDER — ACETAMINOPHEN 500 MG
1000 TABLET ORAL ONCE
Status: DISCONTINUED | OUTPATIENT
Start: 2025-01-10 | End: 2025-01-10 | Stop reason: HOSPADM

## 2025-01-10 RX ORDER — ROPIVACAINE HYDROCHLORIDE 5 MG/ML
INJECTION, SOLUTION EPIDURAL; INFILTRATION; PERINEURAL AS NEEDED
Status: DISCONTINUED | OUTPATIENT
Start: 2025-01-10 | End: 2025-01-10 | Stop reason: HOSPADM

## 2025-01-10 RX ORDER — ACETAMINOPHEN 500 MG
1000 TABLET ORAL ONCE AS NEEDED
Status: DISCONTINUED | OUTPATIENT
Start: 2025-01-10 | End: 2025-01-10

## 2025-01-10 RX ORDER — KETOROLAC TROMETHAMINE 30 MG/ML
INJECTION, SOLUTION INTRAMUSCULAR; INTRAVENOUS AS NEEDED
Status: DISCONTINUED | OUTPATIENT
Start: 2025-01-10 | End: 2025-01-10 | Stop reason: SURG

## 2025-01-10 RX ORDER — PROCHLORPERAZINE EDISYLATE 5 MG/ML
5 INJECTION INTRAMUSCULAR; INTRAVENOUS EVERY 8 HOURS PRN
Status: DISCONTINUED | OUTPATIENT
Start: 2025-01-10 | End: 2025-01-10

## 2025-01-10 RX ORDER — SCOPOLAMINE 1 MG/3D
1 PATCH, EXTENDED RELEASE TRANSDERMAL ONCE
Status: DISCONTINUED | OUTPATIENT
Start: 2025-01-10 | End: 2025-01-10 | Stop reason: HOSPADM

## 2025-01-10 RX ADMIN — SODIUM CHLORIDE, SODIUM LACTATE, POTASSIUM CHLORIDE, CALCIUM CHLORIDE: 600; 310; 30; 20 INJECTION, SOLUTION INTRAVENOUS at 13:25:00

## 2025-01-10 RX ADMIN — LIDOCAINE HYDROCHLORIDE 5 ML: 10 INJECTION, SOLUTION EPIDURAL; INFILTRATION; INTRACAUDAL; PERINEURAL at 12:50:00

## 2025-01-10 RX ADMIN — OXYTOCIN 10 UNITS: 10 INJECTION, SOLUTION INTRAMUSCULAR; INTRAVENOUS at 12:51:00

## 2025-01-10 RX ADMIN — MIDAZOLAM HYDROCHLORIDE 2 MG: 1 INJECTION INTRAMUSCULAR; INTRAVENOUS at 12:47:00

## 2025-01-10 RX ADMIN — KETOROLAC TROMETHAMINE 30 MG: 30 INJECTION, SOLUTION INTRAMUSCULAR; INTRAVENOUS at 13:10:00

## 2025-01-10 NOTE — H&P
[facility]  History & Physical    Magnolia Temple Patient Status:  Hospital Outpatient Surgery    1995 MRN [id number]   Location [facility] PRE OP HOLDING Attending Lawrence Pinedo MD   Hosp Day # 0 PCP None Pcp     SUBJECTIVE:    Reason for Admission:  Missed     History of Present Illness:  Patient is a(n) 29 year old female [id number] LMP recorded (lmp unknown). (contraception: ) serial ultrasounds and quant. BHCG's rising withiout a viable IUP - requesting D&C    See epic notes.     Medications:  Prescriptions Prior to Admission[1]    Allergies:  Allergies[2]     Past Medical History:  Past Medical History:    Anxiety state    Chlamydia    history of    Depression    Hearing impaired person, bilateral    Deaf in right ear    Herpes    current outbreak    Migraines    Visual impairment    Glasses       Past Surgical History:  Past Surgical History:   Procedure Laterality Date      2017     delivery only  2017    Tonsillectomy         Past OB History:  OB History    Para Term  AB Living   1 1 1 0 0 1   SAB IAB Ectopic Multiple Live Births   0 0 0 0 1      # Outcome Date GA Lbr Juliano/2nd Weight Sex Type Anes PTL Lv   1 Term 17 39w6d  8 lb 9.7 oz (3.905 kg) M CS-LTranv EPI N LEXX      Complications: Other - see comments       Past GYN History:   Menarche: 11 (2025  3:48 PM)  Period Cycle (Days): no cycle 5 yrs ( had IUD)/ now pregnant (2025  3:48 PM)  Period Duration (Days): 4 (2025  3:48 PM)  Use of Birth Control (if yes, specify type): Condoms (2025  3:48 PM)  Date When Birth Control Last Used: liletta 10/15/24 taken out (2025  3:48 PM)  Pap Date: 10/19/22 (2025  3:48 PM)  Pap Result Notes: 10/19/22 Neg/Pos; 2017 neg (2025  3:48 PM)  Follow Up Recommendation: colpo today (10/15/2024  9:54 AM)          Social History:  Social History     Tobacco Use    Smoking status: Never    Smokeless tobacco: Never    Substance Use Topics    Alcohol use: No        Family History:  Family History   Problem Relation Age of Onset    Cancer Paternal Grandfather     Diabetes Maternal Grandmother        Review of Systems:  Non-contributory    OBJECTIVE:    Blood pressure 114/67, pulse 70, temperature 98.7 °F (37.1 °C), temperature source Temporal, resp. rate 16, height 62\", weight 156 lb (70.8 kg), SpO2 99%, not currently breastfeeding.  No intake or output data in the 24 hours ending 01/10/25 1244    Physical Exam:  General: Alert, orientated x3. .  Vital Signs:  Blood pressure 114/67, pulse 70, temperature 98.7 °F (37.1 °C), temperature source Temporal, resp. rate 16, height 62\", weight 156 lb (70.8 kg), SpO2 99%, not currently breastfeeding.. VSS Afebrile  HEENT: Exam is unremarkable.  Without scleral icterus.  Mucous membranes are moist. Pupils are equal and round, reactive to light and accommodate.  Oropharynx is clear.  Neck: No tenderness to palpitation.  Full range of motion to flexion and extension, lateral rotation and lateral flexion of cervical spine.  No JVD. Supple.   Lungs: Clear to auscultation bilaterally.  Cardiac: Regular rate and rhythm. No murmur.  Abdomen:  Soft, non-distended, non-tender. Benign without masses or peritoneal signs.   Pelvic:cervix normal in appearance, external genitalia normal, no adnexal masses or tenderness, no cervical motion tenderness, rectovaginal septum normal, uterus normal size, shape, and consistency, and vagina normal without discharge  Extremities:  No lower extremity edema noted.  Without clubbing or cyanosis.    Skin: Normal texture         Diagnostics:  Follow-up pelvic ultrasound done on a patient with a positive pregnancy test and an unknown gestational age.  Imaging done transvaginally on January 8, 2025.     Findings: A gestational sac exists again today measuring 2.9 x 2.4 x 1.4 cm this is slightly smaller than previously imaged there is a yolk sac within this gestational  sac which is also slightly smaller measuring 0.18 cm.  There is no fetal pole there is no cardiac activity.  Both ovaries are seen are normal there is no free fluid in the cul-de-sac.     Impression: Nonviable intrauterine pregnancy with changes consistent consistent with a missed        Data Review:       Latest Reference Range & Units 12/10/24 13:00 24 09:47   ABO BLOOD TYPE  A    RH Factor  Positive    ANTIBODY SCREEN  Negative    HCG QUANTITATIVE <=4.2 mIU/mL 4,623.3 (H) 10,301.3 (H)   Progesterone No established range for female sex ng/mL 19.17    TYPE AND SCREEN  Rpt    (H): Data is abnormally high  Rpt: View report in Results Review for more information    ASSESSMENT:  Missed AB    Patient Active Problem List   Diagnosis    Delivered by  section    Hx of Herpes genitalis    IUD contraception - Liletta IUD placed 2019 - removed            PLAN:    S. D&C    All of the findings and plan were discussed with the patient.  She notes understanding and agrees with the plan of care. She understands the risks and complications of the procedure; including but not limited to bleeding, infection, trauma to GI and  tracts.   All questions were answered.    Lawrence Pinedo MD  1/10/2025  12:44 PM        [1]   Medications Prior to Admission   Medication Sig Dispense Refill Last Dose/Taking    ibuprofen 600 MG Oral Tab Take 1 tablet (600 mg total) by mouth every 6 (six) hours as needed for Pain.   Past Month   [2]   Allergies  Allergen Reactions    Sumatriptan Tightness in Throat

## 2025-01-10 NOTE — DISCHARGE SUMMARY
[facility]  Discharge Summary    Magnolia Temple Patient Status:  Hospital Outpatient Surgery    1995 MRN [id number]   Location [facility] PERIOPERATIVE SERVICE Attending Lawrence Pinedo MD   Hosp Day # 0 PCP None Pcp     Date of Admission: 1/10/2025    Date of Discharge: 1/10/2025    Admitting Diagnosis: MISSED     Discharge Diagnosis:   Patient Active Problem List   Diagnosis    Delivered by  section    Hx of Herpes genitalis    IUD contraception - Liletta IUD placed 2019 - removed     Missed  (HCC)       Reason for Admission: See History and Physical    Hospital Course: uncomplicated    Consultations: none    Procedures: S. D&C    Complications: none    Disposition: Home or Self Care    Discharge Condition: Good    Discharge Medications:   Current Discharge Medication List        CONTINUE these medications which have NOT CHANGED    Details   ibuprofen 600 MG Oral Tab Take 1 tablet (600 mg total) by mouth every 6 (six) hours as needed for Pain.             Diet:  General    Activity:  Pelvic Rest and  routine post. Operative precautions    Motrin and Tylenol,      Follow up Visits: Follow-up in 2 weeks      Other Discharge Instructions: Pelvic Rest, no heavy lifting    Lawrence Pinedo MD  1/10/2025  1:47 PM

## 2025-01-10 NOTE — ANESTHESIA POSTPROCEDURE EVALUATION
[facility]    Magnolia Temple Patient Status:  Hospital Outpatient Surgery   Age/Gender 29 year old female MRN [id number]   Location [facility] PERIOPERATIVE SERVICE Attending Lawrence Pinedo MD    Day # 0 PCP None Pcp       Anesthesia Post-op Note    SUCTION DILATION AND CURETTAGE    Procedure Summary       Date: 01/10/25 Room / Location:  MAIN OR  MAIN OR    Anesthesia Start: 1247 Anesthesia Stop: 1325    Procedure: SUCTION DILATION AND CURETTAGE Diagnosis: (MISSED )    Surgeons: Lawrence Pinedo MD Anesthesiologist: Manoj Cleveland MD    Anesthesia Type: MAC ASA Status: Not recorded            Anesthesia Type: MAC    Vitals Value Taken Time   BP 98/59 01/10/25 1323   Temp 97 01/10/25 1325   Pulse 91 01/10/25 1325   Resp 15 01/10/25 1325   SpO2 99 % 01/10/25 1325   Vitals shown include unfiled device data.        Patient Location: Same Day Surgery    Anesthesia Type: MAC    Airway Patency: patent    Postop Pain Control: adequate    Mental Status: preanesthetic baseline    Nausea/Vomiting: none    Cardiopulmonary/Hydration status: stable euvolemic    Complications: no apparent anesthesia related complications    Postop vital signs: stable    Dental Exam: Unchanged from Preop    Patient to be discharged home when criteria met.

## 2025-01-10 NOTE — DISCHARGE INSTRUCTIONS
Home Care Instructions  DILATATION AND CURETTAGE (D&C)  Dr WINDY Arreola, BRANDON Graves, AGUSTIN Pinedo, STACEY Cannon, AGAPITO Heller.    Dilation of the cervix (the opening of the uterus) and scraping of the endometrial lining of the uterus for diagnostic and /or therapeutic purposes.    IMPORTANT POINTS TO KNOW  ? You may experience a scratchy throat from the breathing tube used during general anesthesia. You may eat a diet as tolerated.    ? Minimal activity is preferred for 2-3 days    ? It is important to wipe from front to back after elimination    ? No sexual intercourse, douching or use of tampons for 2 weeks    ? Vaginal bleeding or spotting may continue for a week after the procedure    ? Mild cramping may continue for 2-3 days after the procedure. A mild analgesic like Extra Strength Tylenol may be used to relieve the cramping. Return to clinic for follow-up appointment as instructed by physician. You may also use Motrin for discomfort.    NOTIFY THE OFFICE IF:  ? Vaginal bleeding that is heavier than a menstrual period    ? Vaginal discharge that burns, irritated or has a foul odor    ? Chills or temperature over 101 degrees F    ? Severe lower abdominal cramps or pain    ? Frequency, urgency and/or burning with urination    NOTE: the above listed information is meant to be a guide only. If you have problems or questions not answered on the sheet, please contact our office at [phone]      [facility]  [phone]    You received a drug called Toradol which is an Anti Inflammatory at: 1:10pm  If you are allowed to take Anti inflammatories:    Do not take any Anti Inflammatory like Motrin, Aleve or Ibuprophen until after: 7:10pm  Please report any suspected allergic reactions or bleeding issues to your doctor

## 2025-01-10 NOTE — ANESTHESIA PREPROCEDURE EVALUATION
PRE-OP EVALUATION    Patient Name: Magnolia Temple    Admit Diagnosis: MISSED     Pre-op Diagnosis: MISSED     SUCTION DILATION AND CURETTAGE    Anesthesia Procedure: SUCTION DILATION AND CURETTAGE    Surgeons and Role:     * Lawrence Pinedo MD - Primary    Pre-op vitals reviewed.  Temp: 98.7 °F (37.1 °C)  Pulse: 70  Resp: 16  BP: 114/67  SpO2: 99 %  Body mass index is 28.53 kg/m².    Current medications reviewed.  Hospital Medications:   lactated ringers infusion   Intravenous Continuous    doxycycline hyclate (Vibramycin) 200 mg in sodium chloride 0.9% 250 mL IVPB  200 mg Intravenous Once    lactated ringers infusion   Intravenous Continuous    lactated ringers IV bolus 500 mL  500 mL Intravenous Once PRN    atropine 0.1 MG/ML injection 0.5 mg  0.5 mg Intravenous PRN    naloxone (Narcan) 0.4 MG/ML injection 0.08 mg  0.08 mg Intravenous PRN    fentaNYL (Sublimaze) 50 mcg/mL injection 25 mcg  25 mcg Intravenous Q5 Min PRN    Or    fentaNYL (Sublimaze) 50 mcg/mL injection 50 mcg  50 mcg Intravenous Q5 Min PRN    HYDROmorphone (Dilaudid) 1 MG/ML injection 0.2 mg  0.2 mg Intravenous Q5 Min PRN    Or    HYDROmorphone (Dilaudid) 1 MG/ML injection 0.4 mg  0.4 mg Intravenous Q5 Min PRN    Or    HYDROmorphone (Dilaudid) 1 MG/ML injection 0.6 mg  0.6 mg Intravenous Q5 Min PRN    acetaminophen (Tylenol Extra Strength) tab 1,000 mg  1,000 mg Oral Once PRN    Or    HYDROcodone-acetaminophen (Norco) 5-325 MG per tab 1 tablet  1 tablet Oral Once PRN    Or    HYDROcodone-acetaminophen (Norco) 5-325 MG per tab 2 tablet  2 tablet Oral Once PRN    ondansetron (Zofran) 4 MG/2ML injection 4 mg  4 mg Intravenous Q6H PRN    prochlorperazine (Compazine) 10 MG/2ML injection 5 mg  5 mg Intravenous Q8H PRN       Outpatient Medications:   Prescriptions Prior to Admission[1]    Allergies: Sumatriptan      Anesthesia Evaluation    Patient summary reviewed.    Anesthetic Complications  (-) history of anesthetic  complications         GI/Hepatic/Renal    Negative GI/hepatic/renal ROS.                             Cardiovascular    Negative cardiovascular ROS.    Exercise tolerance: good     MET: >4                                           Endo/Other    Negative endo/other ROS.                              Pulmonary    Negative pulmonary ROS.                       Neuro/Psych      (+) depression                                Past Surgical History:   Procedure Laterality Date      2017     delivery only  2017    Tonsillectomy       Social History     Socioeconomic History    Marital status: Single   Tobacco Use    Smoking status: Never    Smokeless tobacco: Never   Substance and Sexual Activity    Alcohol use: No    Drug use: No    Sexual activity: Yes     Partners: Male     Birth control/protection: Condom   Other Topics Concern    Caffeine Concern Yes     Comment: 20 cups a month    Exercise Yes     Comment: walks     History   Drug Use No     Available pre-op labs reviewed.  Lab Results   Component Value Date    WBC 8.4 2025    RBC 4.46 2025    HGB 12.8 2025    HCT 39.3 2025    MCV 88.1 2025    MCH 28.7 2025    MCHC 32.6 2025    RDW 12.5 2025    .0 2025               Airway      Mallampati: II  Mouth opening: >3 FB  TM distance: > 6 cm  Neck ROM: full Cardiovascular    Cardiovascular exam normal.         Dental    Dentition appears grossly intact         Pulmonary    Pulmonary exam normal.                 Other findings              ASA: 1   Plan: MAC  NPO status verified and patient meets guidelines.    Post-procedure pain management plan discussed with surgeon and patient.      Plan/risks discussed with: patient                Present on Admission:  **None**             [1]   Medications Prior to Admission   Medication Sig Dispense Refill Last Dose/Taking    ibuprofen 600 MG Oral Tab Take 1 tablet (600 mg total) by mouth every 6 (six)  hours as needed for Pain.   Past Month      Isotretinoin Pregnancy And Lactation Text: This medication is Pregnancy Category X and is considered extremely dangerous during pregnancy. It is unknown if it is excreted in breast milk.

## 2025-01-10 NOTE — OPERATIVE REPORT
OPERATIVE REPORT     PREOPERATIVE DIAGNOSIS: Missed .   POSTOPERATIVE DIAGNOSIS:  Same  OPERATION: Suction Dilatation and Curettage.   SURGEON: Shahida  ANESTHESIA: Sedation and local.   ESTIMATED BLOOD LOSS: 5   INDICATIONS: Please see history and physical.   FINDINGS: AVAF 6 week sized uterus.  POC send in tubing    The patient's blood type is Rh(+); RhoGAM was not ordered     PROCEDURE: The patient was taken to the operative suite after appropriate counseling and consent. She understands the risks and complications of the procedure, including but not limited to bleeding, infection, and trauma to the GI and  tracts. She was place under IV sedation, positioned in the dorsal lithotomy position, prepped and draped in a sterile fashion. Her bladder  emptied and she was examined. Weighted speculum was introduced into the vagina. Right-angle retractor was used to aid in visualization of the cervix, which was grasped with a single-tooth tenaculum at the 12 o'clock position. Local anesthetic was instilled in the paracervical region using ropivacaine and vasopressin solution. Next, the cervix was systematically dilated to allow a 7 mm curved plastic suction curette.  The curette was introduced into the uterine cavity after pressures were checked. The products were gently vacuumed from the uterine cavity while  IV Pitocin was given. A gentle sharp curettage revealed no residual products, and the final suctioning confirmed excellent hemostasis. The fluids and instruments were removed from the vagina. Bimanual massage was performed. Patient was moved to the supine position, awakened and transferred to the recovery room in stable condition.     Specimen:  products of conception.     COMPLICATIONS: None.

## 2025-01-13 ENCOUNTER — TELEPHONE (OUTPATIENT)
Facility: CLINIC | Age: 30
End: 2025-01-13

## 2025-01-13 NOTE — TELEPHONE ENCOUNTER
Patient called in regards to after her surgery Friday she started to having a little bit of pain on Saturday but nothing to this extent. As of today 1/13/25 she went back to work, she states around 3 hours ago (1:00) she stated to having excruciating pain in her cervix. Pain level is a 8.5 out of 10.     Patient also states that she went to the restroom and there was a massive amount of blood and blood clots.       Spoke to  he said to get her in for a ultrasound tomorrow and a follow up with him.     Appointment scheduled.

## 2025-01-14 ENCOUNTER — OFFICE VISIT (OUTPATIENT)
Facility: CLINIC | Age: 30
End: 2025-01-14
Payer: COMMERCIAL

## 2025-01-14 VITALS
TEMPERATURE: 98 F | WEIGHT: 157 LBS | BODY MASS INDEX: 28.89 KG/M2 | HEIGHT: 62 IN | SYSTOLIC BLOOD PRESSURE: 104 MMHG | DIASTOLIC BLOOD PRESSURE: 70 MMHG

## 2025-01-14 DIAGNOSIS — N93.9 ABNORMAL UTERINE BLEEDING (AUB): ICD-10-CM

## 2025-01-14 DIAGNOSIS — R10.2 PELVIC PAIN: Primary | ICD-10-CM

## 2025-01-14 PROCEDURE — 3078F DIAST BP <80 MM HG: CPT | Performed by: OBSTETRICS & GYNECOLOGY

## 2025-01-14 PROCEDURE — 99024 POSTOP FOLLOW-UP VISIT: CPT | Performed by: OBSTETRICS & GYNECOLOGY

## 2025-01-14 PROCEDURE — 3074F SYST BP LT 130 MM HG: CPT | Performed by: OBSTETRICS & GYNECOLOGY

## 2025-01-14 PROCEDURE — 76856 US EXAM PELVIC COMPLETE: CPT | Performed by: OBSTETRICS & GYNECOLOGY

## 2025-01-14 PROCEDURE — 3008F BODY MASS INDEX DOCD: CPT | Performed by: OBSTETRICS & GYNECOLOGY

## 2025-01-14 RX ORDER — ACETAMINOPHEN 500 MG
500 TABLET ORAL EVERY 6 HOURS PRN
COMMUNITY

## 2025-01-14 NOTE — PROGRESS NOTES
Gynecology Office Visit      Magnolia Temple is a 29 year old female  No LMP recorded (lmp unknown). (contraception:  )     HPI:     Chief Complaint   Patient presents with    Ultrasound     Post op bleeding and pain yesterday    Follow - Up     D&C on Friday. CO pain and bleeding since yesterday.     S/p S D&C - 3 days ago     Bleed a little heavier than expected - some clotting  - two pads today    No F/C    Pelvic pain less today -     Chart and previous encounters reviewed.  HISTORY:  Past Medical History:    Anxiety state    Chlamydia    history of    Depression    Hearing impaired person, bilateral    Deaf in right ear    Herpes    current outbreak    Migraines    Visual impairment    Glasses      Past Surgical History:   Procedure Laterality Date      2017     delivery only  2017    Tonsillectomy  2011      Family History   Problem Relation Age of Onset    Cancer Paternal Grandfather     Diabetes Maternal Grandmother       Social History:   Social History     Socioeconomic History    Marital status: Single   Tobacco Use    Smoking status: Never    Smokeless tobacco: Never   Substance and Sexual Activity    Alcohol use: No    Drug use: No    Sexual activity: Yes     Partners: Male     Birth control/protection: Condom   Other Topics Concern    Caffeine Concern Yes     Comment: 20 cups a month    Exercise Yes     Comment: walks     Social Drivers of Health      [facility]    Social Connections    [facility]    Housing Stability        Medications (Active prior to today's visit):  Current Outpatient Medications   Medication Sig Dispense Refill    acetaminophen (TYLENOL EXTRA STRENGTH) 500 MG Oral Tab Take 1 tablet (500 mg total) by mouth every 6 (six) hours as needed for Pain.      ibuprofen 600 MG Oral Tab Take 1 tablet (600 mg total) by mouth  every 6 (six) hours as needed for Pain. (Patient not taking: Reported on 2025)         Allergies:  Allergies[1]    Gyn:  Menarche: 11  Period Cycle (Days): no cycle 5 yrs ( had IUD)/ now pregnant  Period Duration (Days): 4  Use of Birth Control (if yes, specify type): Condoms  Date When Birth Control Last Used: liletta 10/15/24 taken out  Pap Date: 10/19/22  Pap Result Notes: 10/19/22 Neg/Pos; 2017 neg  Follow Up Recommendation:     OB Hx:  OB History    Para Term  AB Living   2 1 1 0 1 1   SAB IAB Ectopic Multiple Live Births   1 0 0 0 1      # Outcome Date GA Lbr Juliano/2nd Weight Sex Type Anes PTL Lv   2 SAB            1 Term 17 39w6d  8 lb 9.7 oz (3.905 kg) M CS-LTranv EPI N LEXX      Complications: Other - see comments         ROS:     10 point ROS completed and was negative, except for pertinent positive and negatives stated in the HPI.    PHYSICAL EXAM:   /70   Temp 97.9 °F (36.6 °C)   Ht 62\"   Wt 157 lb (71.2 kg)   LMP  (LMP Unknown)   BMI 28.72 kg/m²      Wt Readings from Last 6 Encounters:   25 157 lb (71.2 kg)   01/10/25 156 lb (70.8 kg)   25 165 lb (74.8 kg)   12/10/24 161 lb (73 kg)   24 161 lb (73 kg)   10/15/24 173 lb (78.5 kg)        Gen:  Oriented, in no acute distress  Abdomen: no scars, scaphoid, benign without peritoneal signs, rebound tenderness,   guarding, or masses      Pelvic ultrasound done for follow-up after a suction dilatation and curettage for missed  done 3 days ago.  Patient is noting some increasing clotting and pain.  Imaging done transvaginally today on 2025.    Findings: Anteverted anteflexed uterus measuring 8.6 x 5.5 x 4.0 cm with a thin 9 mm endometrial stripe there is no evidence of retained products.  There is no free fluid in the cul-de-sac there is no adnexal masses.  Right ovary measures 2.4 x 2.0 x 2.3 cm and is normal the left ovary could not be seen but there are no adnexal  masses.    Impression: Normal pelvic ultrasound without retained products or evidence of uterine perforation.         1. Pelvic pain  - US, Pelvic [90945]; Future  - US, Pelvic [62359]    2. Abnormal uterine bleeding (AUB)  - US, Pelvic [01138]; Future  - US, Pelvic [02457]      No evidence of PID or endomyometritis after S D&C    Probably got behind on the post op pain medication      Meds This Visit:  Requested Prescriptions      No prescriptions requested or ordered in this encounter       Imaging & Referrals:  US PELVIS, ABDOMINAL [facility] ONLY     Return in about 2 weeks (around 1/28/2025) for Post. Op..      Lawrence Pinedo MD  1/14/2025  2:00 PM         This note was created by [employer] voice recognition. Errors in content may be related to improper recognition by the system; efforts to review and correct have been done but errors may still exist. Please contact me with any questions.    Note to patient and family   The 21st Century Cures Act makes medical notes available to patients in the interest of transparency.  However, please be advised that this is a medical document.  It is intended as immi-cv-nseq communication.  It is written and medical language may contain abbreviations or verbiage that are technical and unfamiliar.  It may appear blunt or direct.  Medical documents are intended to carry relevant information, facts as evident, and the clinical opinion of the practitioner.           [1]   Allergies  Allergen Reactions    Sumatriptan Tightness in Throat

## 2025-01-18 ENCOUNTER — HOSPITAL ENCOUNTER (EMERGENCY)
Facility: HOSPITAL | Age: 30
Discharge: HOME | End: 2025-01-18
Attending: EMERGENCY MEDICINE
Payer: COMMERCIAL

## 2025-01-18 VITALS
OXYGEN SATURATION: 99 % | TEMPERATURE: 101 F | HEART RATE: 94 BPM | DIASTOLIC BLOOD PRESSURE: 83 MMHG | RESPIRATION RATE: 18 BRPM | BODY MASS INDEX: 28.71 KG/M2 | SYSTOLIC BLOOD PRESSURE: 106 MMHG | HEIGHT: 62 IN | WEIGHT: 156 LBS

## 2025-01-18 DIAGNOSIS — J10.1 INFLUENZA A: Primary | ICD-10-CM

## 2025-01-18 DIAGNOSIS — G43.809 OTHER MIGRAINE WITHOUT STATUS MIGRAINOSUS, NOT INTRACTABLE: ICD-10-CM

## 2025-01-18 LAB
ALBUMIN SERPL-MCNC: 4.6 G/DL (ref 3.2–4.8)
ALBUMIN/GLOB SERPL: 1.7 {RATIO} (ref 1–2)
ALP LIVER SERPL-CCNC: 51 U/L
ALT SERPL-CCNC: 18 U/L
ANION GAP SERPL CALC-SCNC: 11 MMOL/L (ref 0–18)
AST SERPL-CCNC: 23 U/L (ref ?–34)
BASOPHILS # BLD AUTO: 0.01 X10(3) UL (ref 0–0.2)
BASOPHILS NFR BLD AUTO: 0.4 %
BILIRUB SERPL-MCNC: 0.3 MG/DL (ref 0.3–1.2)
BUN BLD-MCNC: 10 MG/DL (ref 9–23)
CALCIUM BLD-MCNC: 9.5 MG/DL (ref 8.7–10.6)
CHLORIDE SERPL-SCNC: 105 MMOL/L (ref 98–112)
CO2 SERPL-SCNC: 22 MMOL/L (ref 21–32)
CREAT BLD-MCNC: 0.99 MG/DL
EGFRCR SERPLBLD CKD-EPI 2021: 78 ML/MIN/1.73M2 (ref 60–?)
EOSINOPHIL # BLD AUTO: 0 X10(3) UL (ref 0–0.7)
EOSINOPHIL NFR BLD AUTO: 0 %
ERYTHROCYTE [DISTWIDTH] IN BLOOD BY AUTOMATED COUNT: 12.9 %
FLUAV + FLUBV RNA SPEC NAA+PROBE: NEGATIVE
FLUAV + FLUBV RNA SPEC NAA+PROBE: POSITIVE
GLOBULIN PLAS-MCNC: 2.7 G/DL (ref 2–3.5)
GLUCOSE BLD-MCNC: 100 MG/DL (ref 70–99)
HCT VFR BLD AUTO: 35.2 %
HGB BLD-MCNC: 12.2 G/DL
IMM GRANULOCYTES # BLD AUTO: 0 X10(3) UL (ref 0–1)
IMM GRANULOCYTES NFR BLD: 0 %
LYMPHOCYTES # BLD AUTO: 0.43 X10(3) UL (ref 1–4)
LYMPHOCYTES NFR BLD AUTO: 17.7 %
MCH RBC QN AUTO: 29.4 PG (ref 26–34)
MCHC RBC AUTO-ENTMCNC: 34.7 G/DL (ref 31–37)
MCV RBC AUTO: 84.8 FL
MONOCYTES # BLD AUTO: 0.25 X10(3) UL (ref 0.1–1)
MONOCYTES NFR BLD AUTO: 10.3 %
NEUTROPHILS # BLD AUTO: 1.74 X10 (3) UL (ref 1.5–7.7)
NEUTROPHILS # BLD AUTO: 1.74 X10(3) UL (ref 1.5–7.7)
NEUTROPHILS NFR BLD AUTO: 71.6 %
OSMOLALITY SERPL CALC.SUM OF ELEC: 285 MOSM/KG (ref 275–295)
PLATELET # BLD AUTO: 200 10(3)UL (ref 150–450)
POTASSIUM SERPL-SCNC: 4.2 MMOL/L (ref 3.5–5.1)
PROT SERPL-MCNC: 7.3 G/DL (ref 5.7–8.2)
RBC # BLD AUTO: 4.15 X10(6)UL
RSV RNA SPEC NAA+PROBE: NEGATIVE
SARS-COV-2 RNA RESP QL NAA+PROBE: NOT DETECTED
SODIUM SERPL-SCNC: 138 MMOL/L (ref 136–145)
WBC # BLD AUTO: 2.4 X10(3) UL (ref 4–11)

## 2025-01-18 PROCEDURE — 96375 TX/PRO/DX INJ NEW DRUG ADDON: CPT

## 2025-01-18 PROCEDURE — 85025 COMPLETE CBC W/AUTO DIFF WBC: CPT | Performed by: EMERGENCY MEDICINE

## 2025-01-18 PROCEDURE — S0119 ONDANSETRON 4 MG: HCPCS

## 2025-01-18 PROCEDURE — 96361 HYDRATE IV INFUSION ADD-ON: CPT

## 2025-01-18 PROCEDURE — 99284 EMERGENCY DEPT VISIT MOD MDM: CPT

## 2025-01-18 PROCEDURE — 80053 COMPREHEN METABOLIC PANEL: CPT | Performed by: EMERGENCY MEDICINE

## 2025-01-18 PROCEDURE — 96374 THER/PROPH/DIAG INJ IV PUSH: CPT

## 2025-01-18 PROCEDURE — 0241U SARS-COV-2/FLU A AND B/RSV BY PCR (GENEXPERT): CPT | Performed by: EMERGENCY MEDICINE

## 2025-01-18 PROCEDURE — 0241U SARS-COV-2/FLU A AND B/RSV BY PCR (GENEXPERT): CPT

## 2025-01-18 RX ORDER — ONDANSETRON 4 MG/1
4 TABLET, ORALLY DISINTEGRATING ORAL ONCE
Status: COMPLETED | OUTPATIENT
Start: 2025-01-18 | End: 2025-01-18

## 2025-01-18 RX ORDER — BENZONATATE 200 MG/1
200 CAPSULE ORAL 3 TIMES DAILY PRN
Qty: 30 CAPSULE | Refills: 0 | Status: SHIPPED | OUTPATIENT
Start: 2025-01-18 | End: 2025-02-17

## 2025-01-18 RX ORDER — ACETAMINOPHEN 500 MG
1000 TABLET ORAL ONCE
Status: COMPLETED | OUTPATIENT
Start: 2025-01-18 | End: 2025-01-18

## 2025-01-18 RX ORDER — ONDANSETRON 4 MG/1
4 TABLET, ORALLY DISINTEGRATING ORAL EVERY 4 HOURS PRN
Qty: 10 TABLET | Refills: 0 | Status: SHIPPED | OUTPATIENT
Start: 2025-01-18 | End: 2025-01-25

## 2025-01-18 RX ORDER — METOCLOPRAMIDE HYDROCHLORIDE 5 MG/ML
10 INJECTION INTRAMUSCULAR; INTRAVENOUS ONCE
Status: COMPLETED | OUTPATIENT
Start: 2025-01-18 | End: 2025-01-18

## 2025-01-18 RX ORDER — DIPHENHYDRAMINE HYDROCHLORIDE 50 MG/ML
25 INJECTION INTRAMUSCULAR; INTRAVENOUS ONCE
Status: COMPLETED | OUTPATIENT
Start: 2025-01-18 | End: 2025-01-18

## 2025-01-18 RX ORDER — KETOROLAC TROMETHAMINE 15 MG/ML
15 INJECTION, SOLUTION INTRAMUSCULAR; INTRAVENOUS ONCE
Status: COMPLETED | OUTPATIENT
Start: 2025-01-18 | End: 2025-01-18

## 2025-01-18 RX ORDER — NAPROXEN 500 MG/1
500 TABLET ORAL 2 TIMES DAILY PRN
Qty: 20 TABLET | Refills: 0 | Status: SHIPPED | OUTPATIENT
Start: 2025-01-18 | End: 2025-01-28

## 2025-01-19 NOTE — ED INITIAL ASSESSMENT (HPI)
Pt to ER with c/o nausea, vomiting, lightheaded, fever, body aches.   Son was recently diagnosed with Influenza A.

## 2025-01-19 NOTE — ED PROVIDER NOTES
Patient Seen in: [facility] Emergency Department      History     Chief Complaint   Patient presents with    Cough/URI    Nausea/Vomiting/Diarrhea     Stated Complaint: DNC last Friday, states she is in pain n/v and body aches-son has the flu    Subjective:   HPI      29-year-old female with a past medical history as below including anxiety, depression, migraines presents with flulike symptoms that started 3 days ago.  Patient reports fever/chills, body aches, cough, runny nose.  Patient reports 2 episodes of NBNB emesis today.  Denies diarrhea.  Denies abdominal pain.  She reports having a migraine headache typical of previous migraines.  Father states son was sick this past week and diagnosed with influenza.  Patient states she had a D&C for miscarriage on 1/10/2025.  States bleeding has improved.    Objective:     Past Medical History:    Anxiety state    Chlamydia    history of    Depression    Hearing impaired person, bilateral    Deaf in right ear    Herpes    current outbreak    Migraines    Visual impairment    Glasses              Past Surgical History:   Procedure Laterality Date      2017     delivery only  2017    Tonsillectomy  2011                Social History     Socioeconomic History    Marital status: Single   Tobacco Use    Smoking status: Never    Smokeless tobacco: Never   Vaping Use    Vaping status: Never Used   Substance and Sexual Activity    Alcohol use: No    Drug use: No    Sexual activity: Yes     Partners: Male     Birth control/protection: Condom   Other Topics Concern    Caffeine Concern Yes     Comment: 20 cups a month    Exercise Yes     Comment: walks     Social Drivers of Health      Received from [facility], [facility]    Social Connections    Received from [facility], [facility]    Housing Stability                  Physical Exam     ED Triage Vitals [25]   BP  100/65   Pulse 108   Resp 22   Temp (!) 101 °F (38.3 °C)   Temp src Oral   SpO2 98 %   O2 Device None (Room air)       Current Vitals:   Vital Signs  BP: 106/83  Pulse: 94  Resp: 18  Temp: (!) 100.6 °F (38.1 °C)  Temp src: Oral  MAP (mmHg): 76    Oxygen Therapy  SpO2: 99 %  O2 Device: None (Room air)        Physical Exam  Vitals and nursing note reviewed.   Constitutional:       Appearance: She is well-developed.   HENT:      Head: Normocephalic and atraumatic.      Mouth/Throat:      Mouth: Mucous membranes are moist.   Eyes:      General: No scleral icterus.     Conjunctiva/sclera: Conjunctivae normal.   Cardiovascular:      Rate and Rhythm: Normal rate and regular rhythm.   Pulmonary:      Effort: Pulmonary effort is normal. No respiratory distress.      Breath sounds: Normal breath sounds. No wheezing.   Abdominal:      General: Bowel sounds are normal. There is no distension.      Palpations: Abdomen is soft.   Skin:     General: Skin is warm and dry.   Neurological:      General: No focal deficit present.      Mental Status: She is alert and oriented to person, place, and time.      Cranial Nerves: No cranial nerve deficit.      Motor: No weakness.   Psychiatric:         Mood and Affect: Mood normal.         Behavior: Behavior normal.             ED Course     Labs Reviewed   COMP METABOLIC PANEL (14) - Abnormal; Notable for the following components:       Result Value    Glucose 100 (*)     All other components within normal limits   CBC WITH DIFFERENTIAL WITH PLATELET - Abnormal; Notable for the following components:    WBC 2.4 (*)     Lymphocyte Absolute 0.43 (*)     All other components within normal limits   SARS-COV-2/FLU A AND B/RSV BY PCR (GENEXPERT) - Abnormal; Notable for the following components:    Influenza A by PCR Positive (*)     All other components within normal limits    Narrative:     This test is intended for the qualitative detection and differentiation of SARS-CoV-2, influenza A,  influenza B, and respiratory syncytial virus (RSV) viral RNA in nasopharyngeal or nares swabs from individuals suspected of respiratory viral infection consistent with COVID-19 by their healthcare provider. Signs and symptoms of respiratory viral infection due to SARS-CoV-2, influenza, and RSV can be similar.    Test performed using the Xpert Xpress SARS-CoV-2/FLU/RSV (real time RT-PCR)  assay on the GeneXpert instrument, [employer], [place] [address].   This test is being used under the Food and Drug Administration's Emergency Use Authorization.    The authorized Fact Sheet for Healthcare Providers for this assay is available upon request from the laboratory.                   MDM        29-year-old female with a past medical history as below including anxiety, depression, migraines presents with flulike symptoms that started 3 days ago.     Differential includes but is not limited to COVID, influenza, other viral syndrome, migraine headache, dehydration, electrolyte abnormality    Influenza A PCR is positive.  Labs are otherwise unremarkable with mild leukopenia consistent with viral illness.  Hemoglobin is normal at 12.2 ultimately change from previous of 12.8 on 1/9/2025.  No evidence of dehydration with normal electrolytes and renal function.    Patient was treated with Toradol, Reglan, Benadryl and IV fluids with improvement improvement in symptoms.  Instructed on symptomatic and supportive care for influenza infection.  Symptoms started over 48 hours ago and Tamiflu is not indicated.  Will give Rx for naproxen, Zofran and Tessalon.  Vies close follow-up with PCP.  Return precaution discussed      Medical Decision Making  Amount and/or Complexity of Data Reviewed  Independent Historian: parent     Details: See HPI  Labs: ordered. Decision-making details documented in ED Course.    Risk  Prescription drug management.        Disposition and Plan     Clinical Impression:  1. Influenza A    2. Other migraine  without status migrainosus, not intractable         Disposition:  Discharge  1/18/2025  9:28 pm    Follow-up:  Fabian Degroot MD  [address]  [facility] [address]  [phone]    Schedule an appointment as soon as possible for a visit in 2 day(s)            Medications Prescribed:  Current Discharge Medication List        START taking these medications    Details   naproxen 500 MG Oral Tab Take 1 tablet (500 mg total) by mouth 2 (two) times daily as needed.  Qty: 20 tablet, Refills: 0      benzonatate 200 MG Oral Cap Take 1 capsule (200 mg total) by mouth 3 (three) times daily as needed for cough.  Qty: 30 capsule, Refills: 0      ondansetron 4 MG Oral Tablet Dispersible Take 1 tablet (4 mg total) by mouth every 4 (four) hours as needed for Nausea.  Qty: 10 tablet, Refills: 0                 Supplementary Documentation:

## 2025-02-04 ENCOUNTER — OFFICE VISIT (OUTPATIENT)
Facility: CLINIC | Age: 30
End: 2025-02-04
Payer: COMMERCIAL

## 2025-02-04 VITALS
WEIGHT: 149 LBS | DIASTOLIC BLOOD PRESSURE: 74 MMHG | SYSTOLIC BLOOD PRESSURE: 102 MMHG | BODY MASS INDEX: 27.42 KG/M2 | HEIGHT: 62 IN

## 2025-02-04 DIAGNOSIS — Z32.00 ENCOUNTER FOR PREGNANCY TEST, RESULT UNKNOWN: ICD-10-CM

## 2025-02-04 DIAGNOSIS — Z09 SURGERY FOLLOW-UP: Primary | ICD-10-CM

## 2025-02-04 LAB
CONTROL LINE PRESENT WITH A CLEAR BACKGROUND (YES/NO): YES YES/NO
KIT LOT #: NORMAL NUMERIC
PREGNANCY TEST, URINE: NEGATIVE

## 2025-02-04 PROCEDURE — 3074F SYST BP LT 130 MM HG: CPT | Performed by: OBSTETRICS & GYNECOLOGY

## 2025-02-04 PROCEDURE — 99024 POSTOP FOLLOW-UP VISIT: CPT | Performed by: OBSTETRICS & GYNECOLOGY

## 2025-02-04 PROCEDURE — 3008F BODY MASS INDEX DOCD: CPT | Performed by: OBSTETRICS & GYNECOLOGY

## 2025-02-04 PROCEDURE — 81025 URINE PREGNANCY TEST: CPT | Performed by: OBSTETRICS & GYNECOLOGY

## 2025-02-04 PROCEDURE — 3078F DIAST BP <80 MM HG: CPT | Performed by: OBSTETRICS & GYNECOLOGY

## 2025-02-04 NOTE — PROGRESS NOTES
POST OPERATIVE VISIT      S/p  S D&C  For:  MAB Date: 1/10/25  Complications:   none       no complaints    The patient's symptoms have  resolved since the procedure. Had pain the day after - got behind on medication    The intraoperative  findings were  reviewed with the patient with images if they were taken.    Pathology:    Final Diagnosis:   Products of conception, curettage:  -Secretory endometrium, decidua, and immature chorionic elements with degenerative changes.        Exam:    Vitals:    02/04/25 1031   BP: 102/74        Deferred    UCG:     A: s/p S D&C    P:  Routine post. Operative instructions       Follow up:  dec, 2025 for WWE or (+) UCG    Lawrence Pinedo MD  2/4/2025

## (undated) DEVICE — INTERCEED

## (undated) DEVICE — GLOVE,SURG,SENSICARE SLT,LF,PF,7.5: Brand: MEDLINE

## (undated) DEVICE — CURETTE VAC 7MM CRV VACURET

## (undated) DEVICE — Device

## (undated) DEVICE — SYSTEM COLL W/ TISS TRAP INCLUDE COLL CANSTR

## (undated) DEVICE — INSORB SKIN STAPLER

## (undated) DEVICE — NEEDLE SPNL 22GA L3.5IN BLK QNCKE STYL DISP

## (undated) DEVICE — PACK GYNE CUSTOM

## (undated) DEVICE — SLEEVE COMPR MD KNEE LEN SGL USE KENDALL SCD

## (undated) DEVICE — HOSE CONN 18IN SER FOR BERK SAFETOUCH COLL

## (undated) DEVICE — SOLUTION IRRIG 1000ML 0.9% NACL USP BTL

## (undated) NOTE — LETTER
[facility]  [id number] [facility], [address] [place] [address]    Consent for Operation    Date: __________________    Time: _______________    1.  I authorize the performance upon Carlon Romberg the following operation:                              Primary C videotape. The [facility] [place] will not be responsible for storage or maintenance of this tape. 6. For the purpose of advancing medical education, I consent to the admittance of observers to the Operating Room.     7. I authorize the use of any specimen, organs Signature of Patient:   ___________________________    When the patient is a minor or mentally incompetent to give consent:  Signature of person authorized to consent for patient: ___________________________   Relationship to patient: _____________________ · If I am allergic to anything or have had a reaction to anesthesia before. 3. I understand how the anesthesia medicine will help me (benefits). 4. I understand that with any type of anesthesia medicine there are risks:  a.  The most common risks are: their representative has agreed to have anesthesia services.     _____________________________________________________________________________  Witness        Date   Time  I have verified that the signature is that of the patient or patient’s representative

## (undated) NOTE — LETTER
November 18, 2017    Patient: Chyrl Lav   Date of Visit: 11/18/2017       To Whom It May Concern:    Shakir Palacios was seen and treated in our emergency department on 11/18/2017. She should not return to work until 11/20/17.     If you have any qu

## (undated) NOTE — ED AVS SNAPSHOT
Kody Mccauley   MRN: [id number]    Department:  [facility] Emergency Department   Date of Visit:  5/2/2018           Disclosure     Insurance plans vary and the physician(s) referred by the ER may not be covered by your plan.  Please contact your tell this physician (or your personal doctor if your instructions are to return to your personal doctor) about any new or lasting problems. The primary care or specialist physician will see patients referred from the [facility] Emergency Department.  Carlos Rivas